# Patient Record
Sex: MALE | Race: WHITE | Employment: FULL TIME | URBAN - METROPOLITAN AREA
[De-identification: names, ages, dates, MRNs, and addresses within clinical notes are randomized per-mention and may not be internally consistent; named-entity substitution may affect disease eponyms.]

---

## 2018-06-26 ENCOUNTER — LAB ENCOUNTER (OUTPATIENT)
Dept: LAB | Age: 23
End: 2018-06-26
Attending: EMERGENCY MEDICINE
Payer: COMMERCIAL

## 2018-06-26 DIAGNOSIS — R10.9 ABDOMINAL PAIN: Primary | ICD-10-CM

## 2018-06-26 PROCEDURE — 36415 COLL VENOUS BLD VENIPUNCTURE: CPT

## 2018-06-26 PROCEDURE — 80061 LIPID PANEL: CPT

## 2018-06-26 PROCEDURE — 83690 ASSAY OF LIPASE: CPT

## 2018-06-26 PROCEDURE — 80053 COMPREHEN METABOLIC PANEL: CPT

## 2018-06-26 PROCEDURE — 85025 COMPLETE CBC W/AUTO DIFF WBC: CPT

## 2018-11-30 ENCOUNTER — APPOINTMENT (OUTPATIENT)
Dept: LAB | Age: 23
End: 2018-11-30
Attending: INTERNAL MEDICINE
Payer: COMMERCIAL

## 2018-11-30 DIAGNOSIS — R79.89 ABNORMAL LIVER FUNCTION TESTS: ICD-10-CM

## 2018-11-30 PROCEDURE — 80076 HEPATIC FUNCTION PANEL: CPT

## 2018-11-30 PROCEDURE — 36415 COLL VENOUS BLD VENIPUNCTURE: CPT

## 2018-12-14 ENCOUNTER — HOSPITAL ENCOUNTER (OUTPATIENT)
Dept: ULTRASOUND IMAGING | Age: 23
Discharge: HOME | End: 2018-12-14
Attending: INTERNAL MEDICINE
Payer: COMMERCIAL

## 2018-12-14 ENCOUNTER — LAB ENCOUNTER (OUTPATIENT)
Dept: LAB | Age: 23
End: 2018-12-14
Attending: INTERNAL MEDICINE
Payer: COMMERCIAL

## 2018-12-14 DIAGNOSIS — R79.89 ABNORMAL LIVER FUNCTION TESTS: ICD-10-CM

## 2018-12-14 PROCEDURE — 83540 ASSAY OF IRON: CPT

## 2018-12-14 PROCEDURE — 84439 ASSAY OF FREE THYROXINE: CPT

## 2018-12-14 PROCEDURE — 82728 ASSAY OF FERRITIN: CPT | Performed by: INTERNAL MEDICINE

## 2018-12-14 PROCEDURE — 83516 IMMUNOASSAY NONANTIBODY: CPT

## 2018-12-14 PROCEDURE — 82784 ASSAY IGA/IGD/IGG/IGM EACH: CPT

## 2018-12-14 PROCEDURE — 84443 ASSAY THYROID STIM HORMONE: CPT

## 2018-12-14 PROCEDURE — 86038 ANTINUCLEAR ANTIBODIES: CPT

## 2018-12-14 PROCEDURE — 86704 HEP B CORE ANTIBODY TOTAL: CPT

## 2018-12-14 PROCEDURE — 36415 COLL VENOUS BLD VENIPUNCTURE: CPT

## 2018-12-14 PROCEDURE — 83550 IRON BINDING TEST: CPT

## 2018-12-14 PROCEDURE — 86706 HEP B SURFACE ANTIBODY: CPT | Performed by: INTERNAL MEDICINE

## 2018-12-14 PROCEDURE — 83516 IMMUNOASSAY NONANTIBODY: CPT | Performed by: INTERNAL MEDICINE

## 2018-12-14 PROCEDURE — 86256 FLUORESCENT ANTIBODY TITER: CPT

## 2018-12-14 PROCEDURE — 76700 US EXAM ABDOM COMPLETE: CPT | Performed by: INTERNAL MEDICINE

## 2018-12-14 PROCEDURE — 86803 HEPATITIS C AB TEST: CPT | Performed by: INTERNAL MEDICINE

## 2018-12-14 PROCEDURE — 82103 ALPHA-1-ANTITRYPSIN TOTAL: CPT | Performed by: INTERNAL MEDICINE

## 2018-12-14 PROCEDURE — 86706 HEP B SURFACE ANTIBODY: CPT

## 2018-12-14 PROCEDURE — 87340 HEPATITIS B SURFACE AG IA: CPT

## 2020-05-15 ENCOUNTER — HOSPITAL ENCOUNTER (OUTPATIENT)
Dept: GENERAL RADIOLOGY | Age: 25
Discharge: STILL ADMITTED OR ADMINISTRATIVE | End: 2020-05-15
Attending: EMERGENCY MEDICINE

## 2020-05-15 ENCOUNTER — APPOINTMENT (OUTPATIENT)
Dept: LAB | Age: 25
End: 2020-05-15
Attending: EMERGENCY MEDICINE

## 2020-05-15 ENCOUNTER — HOSPITAL ENCOUNTER (OUTPATIENT)
Age: 25
Discharge: STILL ADMITTED OR ADMINISTRATIVE | End: 2020-05-15

## 2020-05-15 DIAGNOSIS — J18.9 PNEUMONIA: ICD-10-CM

## 2020-05-15 PROCEDURE — 71046 X-RAY EXAM CHEST 2 VIEWS: CPT | Performed by: EMERGENCY MEDICINE

## 2020-10-03 ENCOUNTER — APPOINTMENT (OUTPATIENT)
Dept: GENERAL RADIOLOGY | Age: 25
End: 2020-10-03
Attending: PHYSICIAN ASSISTANT
Payer: COMMERCIAL

## 2020-10-03 ENCOUNTER — HOSPITAL ENCOUNTER (EMERGENCY)
Age: 25
Discharge: HOME | End: 2020-10-03
Attending: EMERGENCY MEDICINE
Payer: COMMERCIAL

## 2020-10-03 VITALS
HEART RATE: 82 BPM | DIASTOLIC BLOOD PRESSURE: 82 MMHG | RESPIRATION RATE: 18 BRPM | HEIGHT: 75 IN | SYSTOLIC BLOOD PRESSURE: 123 MMHG | TEMPERATURE: 98 F | OXYGEN SATURATION: 97 % | BODY MASS INDEX: 29.84 KG/M2 | WEIGHT: 240 LBS

## 2020-10-03 DIAGNOSIS — R07.9 CHEST PAIN OF UNCERTAIN ETIOLOGY: ICD-10-CM

## 2020-10-03 DIAGNOSIS — J06.9 VIRAL URI WITH COUGH: Primary | ICD-10-CM

## 2020-10-03 DIAGNOSIS — F17.200 NEEDS SMOKING CESSATION EDUCATION: ICD-10-CM

## 2020-10-03 PROCEDURE — 99284 EMERGENCY DEPT VISIT MOD MDM: CPT

## 2020-10-03 PROCEDURE — 99285 EMERGENCY DEPT VISIT HI MDM: CPT

## 2020-10-03 PROCEDURE — 93005 ELECTROCARDIOGRAM TRACING: CPT

## 2020-10-03 PROCEDURE — 87430 STREP A AG IA: CPT | Performed by: PHYSICIAN ASSISTANT

## 2020-10-03 PROCEDURE — 87081 CULTURE SCREEN ONLY: CPT | Performed by: PHYSICIAN ASSISTANT

## 2020-10-03 PROCEDURE — 85025 COMPLETE CBC W/AUTO DIFF WBC: CPT | Performed by: PHYSICIAN ASSISTANT

## 2020-10-03 PROCEDURE — 85730 THROMBOPLASTIN TIME PARTIAL: CPT | Performed by: PHYSICIAN ASSISTANT

## 2020-10-03 PROCEDURE — 36415 COLL VENOUS BLD VENIPUNCTURE: CPT

## 2020-10-03 PROCEDURE — 85610 PROTHROMBIN TIME: CPT | Performed by: PHYSICIAN ASSISTANT

## 2020-10-03 PROCEDURE — 93010 ELECTROCARDIOGRAM REPORT: CPT

## 2020-10-03 PROCEDURE — 85379 FIBRIN DEGRADATION QUANT: CPT | Performed by: PHYSICIAN ASSISTANT

## 2020-10-03 PROCEDURE — 71045 X-RAY EXAM CHEST 1 VIEW: CPT | Performed by: PHYSICIAN ASSISTANT

## 2020-10-03 PROCEDURE — 84484 ASSAY OF TROPONIN QUANT: CPT | Performed by: PHYSICIAN ASSISTANT

## 2020-10-03 PROCEDURE — 80053 COMPREHEN METABOLIC PANEL: CPT | Performed by: PHYSICIAN ASSISTANT

## 2020-10-03 NOTE — ED PROVIDER NOTES
The patient's case was discussed with me by physician's assistant Kehinde Arriaga. I interviewed and examined the patient. Agree with the work-up thus far. Await d-dimer results to determine whether a CTA of the chest needs to be performed.   Otherwise agre

## 2020-10-03 NOTE — ED PROVIDER NOTES
Patient Seen in: [facility] Emergency Department In [place]      History   Patient presents with:  Sore Throat    Stated Complaint: Congestion, sore throat, headache x 2 weeks.      38-year-old  male without significant past medical history pressumeet normal weight. He is not ill-appearing, toxic-appearing or diaphoretic. HENT:      Head: Normocephalic and atraumatic. Nose: No congestion or rhinorrhea. Right Sinus: No maxillary sinus tenderness or frontal sinus tenderness.       Left Sinus: N ---------                               -----------         ------                     CBC W/ DIFFERENTIAL[[id number]]                              Final result                 Please view results for these tests on the individual orders.    SCAN SLIDE   G benign lesions such as granuloma. There is no infiltrate, consolidation or pleural effusion. There is no pulmonary edema. No acute osseous abnormality identified.             CONCLUSION:  1. Stable nodular opacities in both upper lung zones likely reflec

## 2020-11-23 ENCOUNTER — APPOINTMENT (OUTPATIENT)
Dept: CT IMAGING | Age: 25
End: 2020-11-23
Attending: EMERGENCY MEDICINE
Payer: COMMERCIAL

## 2020-11-23 ENCOUNTER — HOSPITAL ENCOUNTER (EMERGENCY)
Age: 25
Discharge: HOME | End: 2020-11-23
Attending: EMERGENCY MEDICINE
Payer: COMMERCIAL

## 2020-11-23 VITALS
HEART RATE: 74 BPM | OXYGEN SATURATION: 97 % | RESPIRATION RATE: 18 BRPM | HEIGHT: 75 IN | WEIGHT: 241 LBS | SYSTOLIC BLOOD PRESSURE: 125 MMHG | DIASTOLIC BLOOD PRESSURE: 64 MMHG | TEMPERATURE: 98 F | BODY MASS INDEX: 29.97 KG/M2

## 2020-11-23 DIAGNOSIS — R06.02 SHORTNESS OF BREATH: ICD-10-CM

## 2020-11-23 DIAGNOSIS — K21.00 GASTROESOPHAGEAL REFLUX DISEASE WITH ESOPHAGITIS WITHOUT HEMORRHAGE: Primary | ICD-10-CM

## 2020-11-23 PROCEDURE — 80053 COMPREHEN METABOLIC PANEL: CPT | Performed by: EMERGENCY MEDICINE

## 2020-11-23 PROCEDURE — 71260 CT THORAX DX C+: CPT | Performed by: EMERGENCY MEDICINE

## 2020-11-23 PROCEDURE — 99284 EMERGENCY DEPT VISIT MOD MDM: CPT

## 2020-11-23 PROCEDURE — 85025 COMPLETE CBC W/AUTO DIFF WBC: CPT | Performed by: EMERGENCY MEDICINE

## 2020-11-23 PROCEDURE — 85379 FIBRIN DEGRADATION QUANT: CPT | Performed by: EMERGENCY MEDICINE

## 2020-11-23 PROCEDURE — 36415 COLL VENOUS BLD VENIPUNCTURE: CPT

## 2020-11-23 RX ORDER — ALBUTEROL SULFATE 90 UG/1
AEROSOL, METERED RESPIRATORY (INHALATION) EVERY 6 HOURS PRN
COMMUNITY

## 2020-11-23 RX ORDER — DIAZEPAM 5 MG/1
5 TABLET ORAL EVERY 12 HOURS PRN
COMMUNITY

## 2020-11-23 NOTE — ED PROVIDER NOTES
Patient Seen in: [facility] In [place]      History   Patient presents with:  Cough/URI  Difficulty Breathing    Stated Complaint: short of breath for two months, worsens every day, cough, no fever,     HPI    57-year-old male present wearing a droplet mask on my arrival to the room.  Personal protective equipment including droplet mask, eye protection, and gloves were worn throughout the duration of the exam.  Handwashing was performed prior to and after the exam.  Stethoscope and any e INDICATIONS:  short of breath for two months, worsens every day, cough, no fever,  TECHNIQUE:  CT images were obtained with non-ionic intravenous contrast material. Dose reduction techniques were used.  Dose information is transmitted to the ACR (American C instructed to take medications as prescribed. Patient is aware that they are to return to ED if any worsening problems. Patient was also instructed to followup with the appropriate physician. Patient verbalizes and agrees with plan.   Patient discharged

## 2020-11-23 NOTE — ED INITIAL ASSESSMENT (HPI)
Pt c/o SOB x 2 months. +cough x about 2 months. Sts he has to use inhaler every morning, but  Not sure if it helps. Was tested for covid when cough first started and was negative. No fever. Sts, I feel like my throat is tight all the time.

## 2020-12-09 ENCOUNTER — ORDER TRANSCRIPTION (OUTPATIENT)
Dept: ADMINISTRATIVE | Facility: HOSPITAL | Age: 25
End: 2020-12-09

## 2020-12-09 DIAGNOSIS — R06.00 DYSPNEA: Primary | ICD-10-CM

## 2020-12-09 DIAGNOSIS — Z01.818 PREOP EXAMINATION: Primary | ICD-10-CM

## 2020-12-09 DIAGNOSIS — Z11.59 ENCOUNTER FOR SCREENING FOR OTHER VIRAL DISEASES: ICD-10-CM

## 2020-12-30 ENCOUNTER — HOSPITAL ENCOUNTER (EMERGENCY)
Facility: HOSPITAL | Age: 25
Discharge: HOME | End: 2020-12-30
Attending: EMERGENCY MEDICINE
Payer: MEDICAID

## 2020-12-30 ENCOUNTER — APPOINTMENT (OUTPATIENT)
Dept: GENERAL RADIOLOGY | Facility: HOSPITAL | Age: 25
End: 2020-12-30
Attending: EMERGENCY MEDICINE
Payer: MEDICAID

## 2020-12-30 VITALS
TEMPERATURE: 100 F | WEIGHT: 250 LBS | OXYGEN SATURATION: 96 % | RESPIRATION RATE: 13 BRPM | SYSTOLIC BLOOD PRESSURE: 124 MMHG | HEART RATE: 97 BPM | DIASTOLIC BLOOD PRESSURE: 86 MMHG | HEIGHT: 70 IN | BODY MASS INDEX: 35.79 KG/M2

## 2020-12-30 DIAGNOSIS — U07.1 COVID-19 VIRUS INFECTION: Primary | ICD-10-CM

## 2020-12-30 LAB
ALBUMIN SERPL-MCNC: 4.3 G/DL (ref 3.4–5)
ALBUMIN/GLOB SERPL: 1.2 {RATIO} (ref 1–2)
ALP LIVER SERPL-CCNC: 58 U/L
ALT SERPL-CCNC: 63 U/L
ANION GAP SERPL CALC-SCNC: 4 MMOL/L (ref 0–18)
AST SERPL-CCNC: 49 U/L (ref 15–37)
BASOPHILS # BLD AUTO: 0.07 X10(3) UL (ref 0–0.2)
BASOPHILS NFR BLD AUTO: 1.3 %
BILIRUB SERPL-MCNC: 0.6 MG/DL (ref 0.1–2)
BUN BLD-MCNC: 13 MG/DL (ref 7–18)
BUN/CREAT SERPL: 10.5 (ref 10–20)
CALCIUM BLD-MCNC: 9 MG/DL (ref 8.5–10.1)
CHLORIDE SERPL-SCNC: 108 MMOL/L (ref 98–112)
CK SERPL-CCNC: 169 U/L
CO2 SERPL-SCNC: 22 MMOL/L (ref 21–32)
CREAT BLD-MCNC: 1.24 MG/DL
CRP SERPL-MCNC: 1.57 MG/DL (ref ?–0.3)
D-DIMER: 0.28 UG/ML FEU (ref ?–0.5)
DEPRECATED HBV CORE AB SER IA-ACNC: 127.9 NG/ML
DEPRECATED RDW RBC AUTO: 34.8 FL (ref 35.1–46.3)
EOSINOPHIL # BLD AUTO: 0.08 X10(3) UL (ref 0–0.7)
EOSINOPHIL NFR BLD AUTO: 1.4 %
ERYTHROCYTE [DISTWIDTH] IN BLOOD BY AUTOMATED COUNT: 12.2 % (ref 11–15)
GLOBULIN PLAS-MCNC: 3.7 G/DL (ref 2.8–4.4)
GLUCOSE BLD-MCNC: 96 MG/DL (ref 70–99)
HCT VFR BLD AUTO: 46.8 %
HGB BLD-MCNC: 16.5 G/DL
IMM GRANULOCYTES # BLD AUTO: 0.02 X10(3) UL (ref 0–1)
IMM GRANULOCYTES NFR BLD: 0.4 %
LDH SERPL L TO P-CCNC: 283 U/L
LYMPHOCYTES # BLD AUTO: 1.02 X10(3) UL (ref 1–4)
LYMPHOCYTES NFR BLD AUTO: 18.3 %
M PROTEIN MFR SERPL ELPH: 8 G/DL (ref 6.4–8.2)
MCH RBC QN AUTO: 28.2 PG (ref 26–34)
MCHC RBC AUTO-ENTMCNC: 35.3 G/DL (ref 31–37)
MCV RBC AUTO: 79.9 FL
MONOCYTES # BLD AUTO: 1.07 X10(3) UL (ref 0.1–1)
MONOCYTES NFR BLD AUTO: 19.2 %
NEUTROPHILS # BLD AUTO: 3.31 X10 (3) UL (ref 1.5–7.7)
NEUTROPHILS # BLD AUTO: 3.31 X10(3) UL (ref 1.5–7.7)
NEUTROPHILS NFR BLD AUTO: 59.4 %
NT-PROBNP SERPL-MCNC: 30 PG/ML (ref ?–125)
OSMOLALITY SERPL CALC.SUM OF ELEC: 278 MOSM/KG (ref 275–295)
PLATELET # BLD AUTO: 212 10(3)UL (ref 150–450)
POTASSIUM SERPL-SCNC: 4.3 MMOL/L (ref 3.5–5.1)
PROCALCITONIN SERPL-MCNC: <0.05 NG/ML (ref ?–0.16)
RBC # BLD AUTO: 5.86 X10(6)UL
SARS-COV-2 RNA RESP QL NAA+PROBE: DETECTED
SODIUM SERPL-SCNC: 134 MMOL/L (ref 136–145)
TROPONIN I SERPL-MCNC: <0.045 NG/ML (ref ?–0.04)
WBC # BLD AUTO: 5.6 X10(3) UL (ref 4–11)

## 2020-12-30 PROCEDURE — 83615 LACTATE (LD) (LDH) ENZYME: CPT | Performed by: EMERGENCY MEDICINE

## 2020-12-30 PROCEDURE — 99285 EMERGENCY DEPT VISIT HI MDM: CPT

## 2020-12-30 PROCEDURE — 85379 FIBRIN DEGRADATION QUANT: CPT | Performed by: EMERGENCY MEDICINE

## 2020-12-30 PROCEDURE — 82728 ASSAY OF FERRITIN: CPT | Performed by: EMERGENCY MEDICINE

## 2020-12-30 PROCEDURE — 86140 C-REACTIVE PROTEIN: CPT | Performed by: EMERGENCY MEDICINE

## 2020-12-30 PROCEDURE — 82550 ASSAY OF CK (CPK): CPT | Performed by: EMERGENCY MEDICINE

## 2020-12-30 PROCEDURE — 80053 COMPREHEN METABOLIC PANEL: CPT | Performed by: EMERGENCY MEDICINE

## 2020-12-30 PROCEDURE — 83880 ASSAY OF NATRIURETIC PEPTIDE: CPT | Performed by: EMERGENCY MEDICINE

## 2020-12-30 PROCEDURE — 85025 COMPLETE CBC W/AUTO DIFF WBC: CPT | Performed by: EMERGENCY MEDICINE

## 2020-12-30 PROCEDURE — 36415 COLL VENOUS BLD VENIPUNCTURE: CPT

## 2020-12-30 PROCEDURE — 99284 EMERGENCY DEPT VISIT MOD MDM: CPT

## 2020-12-30 PROCEDURE — 71045 X-RAY EXAM CHEST 1 VIEW: CPT | Performed by: EMERGENCY MEDICINE

## 2020-12-30 PROCEDURE — 84484 ASSAY OF TROPONIN QUANT: CPT | Performed by: EMERGENCY MEDICINE

## 2020-12-30 PROCEDURE — 84145 PROCALCITONIN (PCT): CPT | Performed by: EMERGENCY MEDICINE

## 2020-12-30 NOTE — ED INITIAL ASSESSMENT (HPI)
A/o x3, pt with flu like symptoms that began today around 3am, fever of 101, also body aches, cough and sore throat, took Tylenol this morning, also lost smell. Pt also feels chest pressure and OSIEL which is worse on exertion, using inhaler.

## 2020-12-30 NOTE — ED PROVIDER NOTES
Patient Seen in: [facility] Emergency Department      History   Patient presents with:  Cough/URI  Fever    Stated Complaint: Cough, fever, exposed to COVID; symptoms started at 0300 today    HPI    43-year-old male presents emergency room with milagros membranes are moist, oropharynx is clear, uvula midline. HEART: Regular rate and rhythm, no murmurs. LUNGS: Clear to auscultation bilaterally. No Rales, no rhonchi, no wheezing, no stridor.   ABDOMEN: Soft, nondistended,non tender  EXTREMITIES: No sandy of critical care time (exclusive of billable procedures) was administered to manage the patient's critical lab values due to his Covid infection requiring emergency use authorization of monoclonal antibody bamlanivimab due to his BMI greater than 35.   This pm    Follow-up:  Raymundo Barrera MD  [address] [facility] [phone]    In 2 days            Medications Prescribed:  Current Discharge Medication List

## 2021-02-02 ENCOUNTER — RT VISIT (OUTPATIENT)
Dept: RESPIRATORY THERAPY | Facility: HOSPITAL | Age: 26
End: 2021-02-02
Attending: INTERNAL MEDICINE
Payer: MEDICAID

## 2021-02-02 DIAGNOSIS — R06.00 DYSPNEA: ICD-10-CM

## 2021-02-02 PROCEDURE — 94729 DIFFUSING CAPACITY: CPT

## 2021-02-02 PROCEDURE — 94060 EVALUATION OF WHEEZING: CPT

## 2021-02-02 PROCEDURE — 94726 PLETHYSMOGRAPHY LUNG VOLUMES: CPT

## 2021-02-03 NOTE — PROCEDURES
Findings:  Postbronchodilator FEV1 is 3.97L, 75% predicted. Postbronchodilator FVC is 5.00L, 77% predicted. FEV1/ FVC ratio is 0.79. There is a significant bronchodilator response after   administration of albuterol.    The flow-volume loop demonstrates

## 2021-03-10 ENCOUNTER — TELEPHONE (OUTPATIENT)
Dept: INTERNAL MEDICINE CLINIC | Facility: CLINIC | Age: 26
End: 2021-03-10

## 2021-03-10 ENCOUNTER — OFFICE VISIT (OUTPATIENT)
Dept: INTERNAL MEDICINE CLINIC | Facility: CLINIC | Age: 26
End: 2021-03-10
Payer: MEDICAID

## 2021-03-10 VITALS
SYSTOLIC BLOOD PRESSURE: 112 MMHG | HEART RATE: 64 BPM | HEIGHT: 75 IN | TEMPERATURE: 98 F | DIASTOLIC BLOOD PRESSURE: 78 MMHG | RESPIRATION RATE: 18 BRPM | BODY MASS INDEX: 31.41 KG/M2 | WEIGHT: 252.63 LBS

## 2021-03-10 DIAGNOSIS — Z00.00 PE (PHYSICAL EXAM), ANNUAL: Primary | ICD-10-CM

## 2021-03-10 DIAGNOSIS — Z13.220 SCREENING CHOLESTEROL LEVEL: ICD-10-CM

## 2021-03-10 DIAGNOSIS — Z00.00 LABORATORY EXAMINATION ORDERED AS PART OF A ROUTINE GENERAL MEDICAL EXAMINATION: ICD-10-CM

## 2021-03-10 DIAGNOSIS — M79.676 PAIN AROUND TOENAIL: ICD-10-CM

## 2021-03-10 DIAGNOSIS — J45.20 MILD INTERMITTENT ASTHMA, UNSPECIFIED WHETHER COMPLICATED: ICD-10-CM

## 2021-03-10 DIAGNOSIS — Z13.29 SCREENING FOR THYROID DISORDER: ICD-10-CM

## 2021-03-10 PROCEDURE — 99385 PREV VISIT NEW AGE 18-39: CPT | Performed by: INTERNAL MEDICINE

## 2021-03-10 PROCEDURE — 3078F DIAST BP <80 MM HG: CPT | Performed by: INTERNAL MEDICINE

## 2021-03-10 PROCEDURE — 3008F BODY MASS INDEX DOCD: CPT | Performed by: INTERNAL MEDICINE

## 2021-03-10 PROCEDURE — 3074F SYST BP LT 130 MM HG: CPT | Performed by: INTERNAL MEDICINE

## 2021-03-10 RX ORDER — MONTELUKAST SODIUM 10 MG/1
10 TABLET ORAL DAILY
Qty: 90 TABLET | Refills: 3 | Status: SHIPPED | OUTPATIENT
Start: 2021-03-10 | End: 2022-03-05

## 2021-03-10 RX ORDER — BUDESONIDE AND FORMOTEROL FUMARATE DIHYDRATE 160; 4.5 UG/1; UG/1
2 AEROSOL RESPIRATORY (INHALATION) 2 TIMES DAILY
Qty: 1 INHALER | Refills: 0 | Status: SHIPPED | OUTPATIENT
Start: 2021-03-10 | End: 2021-04-09

## 2021-03-10 NOTE — PROGRESS NOTES
Patient presents with:  Establish Care: MR rm 8 NP estalish care       HPI:  Here for cpe. Pt has been having wheezing for 3 mos with a cough at times, with some delaney and sob, worse in the morning. Struggling with this for almost a year.  Normal cxr, pft w Used    Vaping Use      Vaping Use: Never used    Alcohol use: Not Currently      Alcohol/week: 15.0 standard drinks      Types: 15 Standard drinks or equivalent per week    Drug use: No      Current Outpatient Medications   Medication Sig Dispense Refill organomegaly or hernias. Musculoskeletal: Normal range of motion. No edema and no tenderness. No effusions. Lymphadenopathy: No cervical adenopathy. Neurological: Normal reflexes. No cranial nerve deficit or sensory deficit. Normal muscle tone.  Coordi

## 2021-03-10 NOTE — TELEPHONE ENCOUNTER
Pt calling, states he went to pharmacy to  medication and the pharmacist advised him a prior Samuel Grand is needed. Medication Requires Prior Authorization    Name of Medication (include dose, strength and quantity):       Montelukast Sodium (SINGULAIR)

## 2021-03-13 NOTE — TELEPHONE ENCOUNTER
Per Pharmacist Yessy Cisneros, Both medications went through and were picked up by patient. No PA required.

## 2021-04-07 ENCOUNTER — OFFICE VISIT (OUTPATIENT)
Dept: INTERNAL MEDICINE CLINIC | Facility: CLINIC | Age: 26
End: 2021-04-07
Payer: MEDICAID

## 2021-04-07 VITALS
HEIGHT: 75 IN | RESPIRATION RATE: 18 BRPM | BODY MASS INDEX: 31.11 KG/M2 | WEIGHT: 250.19 LBS | TEMPERATURE: 97 F | HEART RATE: 76 BPM | SYSTOLIC BLOOD PRESSURE: 116 MMHG | DIASTOLIC BLOOD PRESSURE: 80 MMHG

## 2021-04-07 DIAGNOSIS — J45.30 MILD PERSISTENT ASTHMA WITHOUT COMPLICATION: Primary | ICD-10-CM

## 2021-04-07 PROCEDURE — 3079F DIAST BP 80-89 MM HG: CPT | Performed by: INTERNAL MEDICINE

## 2021-04-07 PROCEDURE — 3008F BODY MASS INDEX DOCD: CPT | Performed by: INTERNAL MEDICINE

## 2021-04-07 PROCEDURE — 99213 OFFICE O/P EST LOW 20 MIN: CPT | Performed by: INTERNAL MEDICINE

## 2021-04-07 PROCEDURE — 3074F SYST BP LT 130 MM HG: CPT | Performed by: INTERNAL MEDICINE

## 2021-04-09 ENCOUNTER — TELEPHONE (OUTPATIENT)
Dept: INTERNAL MEDICINE CLINIC | Facility: CLINIC | Age: 26
End: 2021-04-09

## 2021-04-09 RX ORDER — BUDESONIDE AND FORMOTEROL FUMARATE DIHYDRATE 160; 4.5 UG/1; UG/1
2 AEROSOL RESPIRATORY (INHALATION) 2 TIMES DAILY
Qty: 1 INHALER | Refills: 0 | Status: SHIPPED | OUTPATIENT
Start: 2021-04-09 | End: 2021-05-21

## 2021-04-09 RX ORDER — BUDESONIDE AND FORMOTEROL FUMARATE DIHYDRATE 160; 4.5 UG/1; UG/1
2 AEROSOL RESPIRATORY (INHALATION) 2 TIMES DAILY
Qty: 1 INHALER | Refills: 0 | Status: SHIPPED | OUTPATIENT
Start: 2021-04-09 | End: 2021-04-09

## 2021-04-09 NOTE — TELEPHONE ENCOUNTER
LOV:3/10/21, CPE, AS  Last CPE:3/10/21, CPE, AS  Last refill:Budesonide-Formoterol Fumarate (SYMBICORT) 160-4.5 MCG/ACT Inhalation Aerosol,3/10/21  Quantity:1 inhaler, 0 refills  Last labs that are related: cbc 12/30/20  Future appointment:  Future Appoint

## 2021-04-09 NOTE — TELEPHONE ENCOUNTER
Pt needs refill sent to     [facility] [address], [address] [place] [address] AT [address], [phone], [phone]

## 2021-04-11 PROBLEM — J45.30 MILD PERSISTENT ASTHMA WITHOUT COMPLICATION: Status: ACTIVE | Noted: 2021-04-11

## 2021-04-11 PROBLEM — J45.30 MILD PERSISTENT ASTHMA WITHOUT COMPLICATION (HCC): Status: ACTIVE | Noted: 2021-04-11

## 2021-04-12 NOTE — PROGRESS NOTES
Patient presents with:  Medication Follow-Up: MR rm 8 allergy med f/up       HPI:  Here for f/u of asthma. Started symbicort and singulair. Pt is much improved, no longer wheezing. Only using his albuterol 1-2 times pr week ow, compared to 6 times per day. carotid pulses. No masses. Cardiovascular: Normal rate, regular rhythm and intact distal pulses. No murmur, rubs or gallops. Pulmonary/Chest: Effort normal and breath sounds normal. No respiratory distress. Abdominal: Soft.  Bowel sounds are normal. No

## 2021-05-21 RX ORDER — BUDESONIDE AND FORMOTEROL FUMARATE DIHYDRATE 160; 4.5 UG/1; UG/1
2 AEROSOL RESPIRATORY (INHALATION) 2 TIMES DAILY
Qty: 3 INHALER | Refills: 0 | Status: SHIPPED | OUTPATIENT
Start: 2021-05-21 | End: 2021-09-24

## 2021-05-21 NOTE — TELEPHONE ENCOUNTER
Last OV 4.7. 21 w/ AS (med f/up)   Last PE 3.10.21   Last REFILL 4.9.21 Symbicort 160-4.5mcg #1inhaler 0R  Last LABS No recent labs within last 12 months     Future Appointments   Date Time Provider Flower Moraes   10/8/2021  2:30 PM Talbert Simmonds, MD

## 2021-09-27 RX ORDER — BUDESONIDE AND FORMOTEROL FUMARATE DIHYDRATE 160; 4.5 UG/1; UG/1
2 AEROSOL RESPIRATORY (INHALATION) 2 TIMES DAILY
Qty: 1 EACH | Refills: 0 | Status: SHIPPED | OUTPATIENT
Start: 2021-09-27 | End: 2021-11-02

## 2021-09-27 NOTE — TELEPHONE ENCOUNTER
Been Following AS  Last OV 4/7/21  Last CPE 3/10/21  Last Labs CBC, CMP 12/30/20    Last Rx fill Symbivort 150/4.5mcg/act #3 0R 5/21/21    Future Appointments   Date Time Provider Flower Moraes   1/19/2022 10:30 AM Omar Dickens MD [facility]

## 2021-09-29 ENCOUNTER — TELEPHONE (OUTPATIENT)
Dept: INTERNAL MEDICINE CLINIC | Facility: CLINIC | Age: 26
End: 2021-09-29

## 2021-09-29 NOTE — TELEPHONE ENCOUNTER
Symbicort not covered by insurance. QL. Submitted PA via CoverMyMeds.  Awaiting approval/denial.     Key: [id number]

## 2021-10-07 ENCOUNTER — TELEPHONE (OUTPATIENT)
Dept: INTERNAL MEDICINE CLINIC | Facility: CLINIC | Age: 26
End: 2021-10-07

## 2021-10-07 NOTE — TELEPHONE ENCOUNTER
Received denial letter from [facility] [address] [place] [address] for prior authorization. Placed in bin for scanning.

## 2021-10-14 ENCOUNTER — LAB ENCOUNTER (OUTPATIENT)
Dept: LAB | Age: 26
End: 2021-10-14
Attending: INTERNAL MEDICINE
Payer: MEDICAID

## 2021-10-14 DIAGNOSIS — Z13.29 SCREENING FOR THYROID DISORDER: ICD-10-CM

## 2021-10-14 DIAGNOSIS — Z13.220 SCREENING CHOLESTEROL LEVEL: ICD-10-CM

## 2021-10-14 DIAGNOSIS — Z00.00 LABORATORY EXAMINATION ORDERED AS PART OF A ROUTINE GENERAL MEDICAL EXAMINATION: ICD-10-CM

## 2021-10-14 PROCEDURE — 84443 ASSAY THYROID STIM HORMONE: CPT

## 2021-10-14 PROCEDURE — 80053 COMPREHEN METABOLIC PANEL: CPT

## 2021-10-14 PROCEDURE — 36415 COLL VENOUS BLD VENIPUNCTURE: CPT

## 2021-10-14 PROCEDURE — 84439 ASSAY OF FREE THYROXINE: CPT

## 2021-10-14 PROCEDURE — 80061 LIPID PANEL: CPT

## 2021-11-02 RX ORDER — BUDESONIDE AND FORMOTEROL FUMARATE DIHYDRATE 160; 4.5 UG/1; UG/1
2 AEROSOL RESPIRATORY (INHALATION) 2 TIMES DAILY
Qty: 10.2 G | Refills: 3 | Status: SHIPPED | OUTPATIENT
Start: 2021-11-02 | End: 2021-12-28

## 2021-11-02 NOTE — TELEPHONE ENCOUNTER
Last OV: 4/7/21 med f/u-A. S. Last PE: 3/10/21- A. S.     Future Appointments   Date Time Provider Flower Aleisha   1/19/2022 10:30 AM Dakota Luther MD [facility]        Latest labs: 4/7/21 ACT- score 18    Latest RX: symbicort- 1 each 0 refi

## 2021-12-29 RX ORDER — BUDESONIDE AND FORMOTEROL FUMARATE DIHYDRATE 160; 4.5 UG/1; UG/1
2 AEROSOL RESPIRATORY (INHALATION) 2 TIMES DAILY
Qty: 10.2 G | Refills: 0 | Status: SHIPPED | OUTPATIENT
Start: 2021-12-29 | End: 2022-01-29

## 2022-01-28 NOTE — TELEPHONE ENCOUNTER
Last OV: 4/7/21 med f/u  Last PE: 3/10/21 est care    Future Appointments   Date Time Provider Flower Aleisha   5/11/2022  6:00 PM Kaiden Valdez MD [facility]        Latest labs: 10/14/21 TSH, Lipid and CMP.  No Hx of ACT    Latest RX: Symbi

## 2022-01-29 RX ORDER — BUDESONIDE AND FORMOTEROL FUMARATE DIHYDRATE 160; 4.5 UG/1; UG/1
AEROSOL RESPIRATORY (INHALATION)
Qty: 10.2 G | Refills: 0 | Status: SHIPPED | OUTPATIENT
Start: 2022-01-29

## 2022-03-03 RX ORDER — BUDESONIDE AND FORMOTEROL FUMARATE DIHYDRATE 160; 4.5 UG/1; UG/1
2 AEROSOL RESPIRATORY (INHALATION) 2 TIMES DAILY
Qty: 10.2 G | Refills: 0 | Status: SHIPPED | OUTPATIENT
Start: 2022-03-03

## 2022-03-03 NOTE — TELEPHONE ENCOUNTER
Last VISIT 04/07/21    Last CPE 03/10/21    Last REFILL 01/29/22 qty 10.2g w/0 refills    Last LABS 10/14/21 CMP, Lipid, TSH done     Future Appointments   Date Time Provider Flower Moraes   5/11/2022  6:00 PM Thom Murrell MD [facility]         Per PROTOCOL? Failed       Please Approve or Deny.

## 2022-04-09 ENCOUNTER — TELEPHONE (OUTPATIENT)
Dept: INTERNAL MEDICINE CLINIC | Facility: CLINIC | Age: 27
End: 2022-04-09

## 2022-04-09 NOTE — TELEPHONE ENCOUNTER
ERICA received from pt requesting records to be sent to [facility]. Form sent to scan stat for further processing and a copy of form sent to scanning.

## 2022-06-30 RX ORDER — BUDESONIDE AND FORMOTEROL FUMARATE DIHYDRATE 160; 4.5 UG/1; UG/1
2 AEROSOL RESPIRATORY (INHALATION) 2 TIMES DAILY
Qty: 10.2 G | Refills: 0 | Status: SHIPPED | OUTPATIENT
Start: 2022-06-30

## 2022-06-30 NOTE — TELEPHONE ENCOUNTER
LOV  4-7-21 no future scheduled.      LF  3-3-22    FD patient needs appointment no refill given yet

## 2022-06-30 NOTE — TELEPHONE ENCOUNTER
BP (!) 159/64   Pulse 64   Temp 97.4 °F (36.3 °C) (Oral)   Resp 16   Ht 5' 6\" (1.676 m)   Wt 143 lb 12.8 oz (65.2 kg)   SpO2 90%   BMI 23.21 kg/m²     Shift assessment complete; see flowsheets. PM medications administered; see MAR. Pt oriented to self and time. Re-oriented to place and situation. Respirations even and unlabored. Pt c/o 8 out of 10 back pain, will give PRN pain medication. Pt denies any further needs or pain at this time. Call light in reach, bed locked in lowest position. LOV   4-7-21    3-3-22      FD needs appt

## 2022-07-06 NOTE — TELEPHONE ENCOUNTER
Future Appointments   Date Time Provider Flower Aleisha   10/12/2022  4:00 PM Kaiden Valdez MD [facility]

## 2022-07-07 RX ORDER — BUDESONIDE AND FORMOTEROL FUMARATE DIHYDRATE 160; 4.5 UG/1; UG/1
AEROSOL RESPIRATORY (INHALATION)
Qty: 10.2 G | Refills: 0 | OUTPATIENT
Start: 2022-07-07

## 2022-08-04 RX ORDER — BUDESONIDE AND FORMOTEROL FUMARATE DIHYDRATE 160; 4.5 UG/1; UG/1
2 AEROSOL RESPIRATORY (INHALATION) 2 TIMES DAILY
Qty: 10.2 G | Refills: 1 | Status: SHIPPED | OUTPATIENT
Start: 2022-08-04

## 2022-08-05 RX ORDER — BUDESONIDE AND FORMOTEROL FUMARATE DIHYDRATE 160; 4.5 UG/1; UG/1
AEROSOL RESPIRATORY (INHALATION)
Qty: 10.2 G | Refills: 0 | OUTPATIENT
Start: 2022-08-05

## 2022-10-03 NOTE — TELEPHONE ENCOUNTER
Last OV: 4/7/21 med f/u  Last PE: 3/10/21    Future Appointments   Date Time Provider Flower Moraes   11/7/2022 11:20 AM Gracia Grigsby MD [facility]        Latest labs: 10/14/21 TSH, Lipid and CMP ACT- 4/7/21- score 18    Latest RX: Symbicort- 10.2g 1 refill on 8/4/22    Per protocol, failed due to below. Rx pending.    Asthma Action Score greater than or equal to 20    AAP/ACT given in last 12 months
Bernadette Little)

## 2022-10-04 RX ORDER — BUDESONIDE AND FORMOTEROL FUMARATE DIHYDRATE 160; 4.5 UG/1; UG/1
AEROSOL RESPIRATORY (INHALATION)
Qty: 10.2 G | Refills: 1 | Status: SHIPPED | OUTPATIENT
Start: 2022-10-04

## 2022-11-07 ENCOUNTER — TELEPHONE (OUTPATIENT)
Dept: INTERNAL MEDICINE CLINIC | Facility: CLINIC | Age: 27
End: 2022-11-07

## 2022-12-05 RX ORDER — BUDESONIDE AND FORMOTEROL FUMARATE DIHYDRATE 160; 4.5 UG/1; UG/1
2 AEROSOL RESPIRATORY (INHALATION) 2 TIMES DAILY
Qty: 10.2 G | Refills: 1 | Status: SHIPPED | OUTPATIENT
Start: 2022-12-05

## 2022-12-05 RX ORDER — BUDESONIDE AND FORMOTEROL FUMARATE DIHYDRATE 160; 4.5 UG/1; UG/1
AEROSOL RESPIRATORY (INHALATION)
Qty: 10.2 G | Refills: 1 | Status: SHIPPED | OUTPATIENT
Start: 2022-12-05

## 2023-02-09 RX ORDER — BUDESONIDE AND FORMOTEROL FUMARATE DIHYDRATE 160; 4.5 UG/1; UG/1
AEROSOL RESPIRATORY (INHALATION)
Qty: 10.2 G | Refills: 1 | Status: CANCELLED | OUTPATIENT
Start: 2023-02-09

## 2023-02-15 RX ORDER — BUDESONIDE AND FORMOTEROL FUMARATE DIHYDRATE 160; 4.5 UG/1; UG/1
AEROSOL RESPIRATORY (INHALATION)
Qty: 10.2 G | Refills: 1 | OUTPATIENT
Start: 2023-02-15

## 2023-02-15 RX ORDER — BUDESONIDE AND FORMOTEROL FUMARATE DIHYDRATE 160; 4.5 UG/1; UG/1
2 AEROSOL RESPIRATORY (INHALATION) 2 TIMES DAILY
Qty: 10.2 G | Refills: 0 | OUTPATIENT
Start: 2023-02-15

## 2023-03-17 ENCOUNTER — OFFICE VISIT (OUTPATIENT)
Dept: INTERNAL MEDICINE CLINIC | Facility: CLINIC | Age: 28
End: 2023-03-17
Payer: MEDICAID

## 2023-03-17 VITALS
DIASTOLIC BLOOD PRESSURE: 74 MMHG | WEIGHT: 283 LBS | SYSTOLIC BLOOD PRESSURE: 122 MMHG | HEIGHT: 75 IN | HEART RATE: 80 BPM | RESPIRATION RATE: 16 BRPM | BODY MASS INDEX: 35.19 KG/M2 | OXYGEN SATURATION: 98 %

## 2023-03-17 DIAGNOSIS — K21.00 GASTROESOPHAGEAL REFLUX DISEASE WITH ESOPHAGITIS WITHOUT HEMORRHAGE: ICD-10-CM

## 2023-03-17 DIAGNOSIS — E66.9 OBESITY (BMI 30-39.9): ICD-10-CM

## 2023-03-17 DIAGNOSIS — J45.30 MILD PERSISTENT ASTHMA WITHOUT COMPLICATION: Primary | ICD-10-CM

## 2023-03-17 DIAGNOSIS — K76.0 FATTY LIVER: ICD-10-CM

## 2023-03-17 DIAGNOSIS — F41.0 PANIC DISORDER: ICD-10-CM

## 2023-03-17 DIAGNOSIS — Z00.00 WELLNESS EXAMINATION: ICD-10-CM

## 2023-03-17 DIAGNOSIS — D75.1 ERYTHROCYTOSIS: ICD-10-CM

## 2023-03-17 DIAGNOSIS — R06.83 SNORING: ICD-10-CM

## 2023-03-17 PROCEDURE — 99395 PREV VISIT EST AGE 18-39: CPT | Performed by: FAMILY MEDICINE

## 2023-03-17 PROCEDURE — 90471 IMMUNIZATION ADMIN: CPT | Performed by: FAMILY MEDICINE

## 2023-03-17 PROCEDURE — 3078F DIAST BP <80 MM HG: CPT | Performed by: FAMILY MEDICINE

## 2023-03-17 PROCEDURE — 3074F SYST BP LT 130 MM HG: CPT | Performed by: FAMILY MEDICINE

## 2023-03-17 PROCEDURE — 90677 PCV20 VACCINE IM: CPT | Performed by: FAMILY MEDICINE

## 2023-03-17 PROCEDURE — 3008F BODY MASS INDEX DOCD: CPT | Performed by: FAMILY MEDICINE

## 2023-03-17 RX ORDER — BUDESONIDE AND FORMOTEROL FUMARATE DIHYDRATE 160; 4.5 UG/1; UG/1
2 AEROSOL RESPIRATORY (INHALATION) 2 TIMES DAILY
Qty: 10.2 G | Refills: 2 | Status: SHIPPED | OUTPATIENT
Start: 2023-03-17 | End: 2023-06-15

## 2023-03-17 RX ORDER — ALBUTEROL SULFATE 90 UG/1
2 AEROSOL, METERED RESPIRATORY (INHALATION) EVERY 4 HOURS PRN
Qty: 18 G | Refills: 0 | Status: SHIPPED | OUTPATIENT
Start: 2023-03-17

## 2023-07-07 DIAGNOSIS — J45.30 MILD PERSISTENT ASTHMA WITHOUT COMPLICATION: ICD-10-CM

## 2023-07-07 RX ORDER — BUDESONIDE AND FORMOTEROL FUMARATE DIHYDRATE 160; 4.5 UG/1; UG/1
AEROSOL RESPIRATORY (INHALATION)
Qty: 10.2 G | Refills: 2 | Status: SHIPPED | OUTPATIENT
Start: 2023-07-07

## 2023-07-07 NOTE — TELEPHONE ENCOUNTER
Requested Prescriptions     Pending Prescriptions Disp Refills    SYMBICORT 160-4.5 MCG/ACT Inhalation Aerosol [Pharmacy Med Name: SYMBICORT 160/4. 5MCG (120 ORAL INH)] 10.2 g 2     Sig: INHALE 2 PUFFS INTO THE LUNGS TWICE DAILY       LOV: 3/17/23    LAST PHYSICAL: 3/17/23  Return in about 1 year    LAST REFILL: 3/17/23    LAST LAB: 10/14/21

## 2023-09-19 DIAGNOSIS — J45.30 MILD PERSISTENT ASTHMA WITHOUT COMPLICATION: ICD-10-CM

## 2023-09-19 RX ORDER — BUDESONIDE AND FORMOTEROL FUMARATE DIHYDRATE 160; 4.5 UG/1; UG/1
2 AEROSOL RESPIRATORY (INHALATION) 2 TIMES DAILY
Qty: 10.2 G | Refills: 2 | Status: SHIPPED | OUTPATIENT
Start: 2023-09-19

## 2023-09-19 NOTE — TELEPHONE ENCOUNTER
Asthma & COPD Medication Protocol [id number]/2023 02:18 AM    Appointment in past 6 or next 3 months    Asthma Action Score greater than or equal to 20    AAP/ACT given in last 12 months       No future visits scheduled    Last visit 3/17/23

## 2023-11-23 ENCOUNTER — HOSPITAL ENCOUNTER (EMERGENCY)
Facility: HOSPITAL | Age: 28
Discharge: HOME | End: 2023-11-23
Attending: EMERGENCY MEDICINE
Payer: MEDICAID

## 2023-11-23 VITALS
OXYGEN SATURATION: 98 % | DIASTOLIC BLOOD PRESSURE: 96 MMHG | HEART RATE: 80 BPM | RESPIRATION RATE: 22 BRPM | TEMPERATURE: 97 F | SYSTOLIC BLOOD PRESSURE: 159 MMHG

## 2023-11-23 DIAGNOSIS — S05.01XA ABRASION OF RIGHT CORNEA, INITIAL ENCOUNTER: Primary | ICD-10-CM

## 2023-11-23 PROCEDURE — 99283 EMERGENCY DEPT VISIT LOW MDM: CPT

## 2023-11-23 RX ORDER — ERYTHROMYCIN 5 MG/G
1 OINTMENT OPHTHALMIC EVERY 6 HOURS
Qty: 3.5 G | Refills: 1 | Status: SHIPPED | OUTPATIENT
Start: 2023-11-23 | End: 2023-11-30

## 2023-11-23 RX ORDER — ERYTHROMYCIN 5 MG/G
1 OINTMENT OPHTHALMIC ONCE
Status: DISCONTINUED | OUTPATIENT
Start: 2023-11-23 | End: 2023-11-23

## 2023-11-23 RX ORDER — ERYTHROMYCIN 5 MG/G
1 OINTMENT OPHTHALMIC ONCE
Status: COMPLETED | OUTPATIENT
Start: 2023-11-23 | End: 2023-11-23

## 2023-12-15 DIAGNOSIS — J45.30 MILD PERSISTENT ASTHMA WITHOUT COMPLICATION: ICD-10-CM

## 2023-12-17 RX ORDER — BUDESONIDE AND FORMOTEROL FUMARATE DIHYDRATE 160; 4.5 UG/1; UG/1
2 AEROSOL RESPIRATORY (INHALATION) 2 TIMES DAILY
Qty: 10.2 G | Refills: 2 | Status: SHIPPED | OUTPATIENT
Start: 2023-12-17

## 2024-02-11 DIAGNOSIS — J45.30 MILD PERSISTENT ASTHMA WITHOUT COMPLICATION: ICD-10-CM

## 2024-02-13 RX ORDER — BUDESONIDE AND FORMOTEROL FUMARATE DIHYDRATE 160; 4.5 UG/1; UG/1
2 AEROSOL RESPIRATORY (INHALATION) 2 TIMES DAILY
Qty: 10.2 G | Refills: 2 | Status: SHIPPED | OUTPATIENT
Start: 2024-02-13

## 2024-02-13 NOTE — TELEPHONE ENCOUNTER
Requested Prescriptions     Pending Prescriptions Disp Refills    BUDESONIDE-FORMOTEROL FUMARATE 160-4.5 MCG/ACT Inhalation Aerosol [Pharmacy Med Name: BUDESONIDE/FORM 160/4.5MCG(120 INH)] 10.2 g 2     Sig: INHALE 2 PUFFS INTO THE LUNGS TWICE DAILY       LOV:  3---asthma/physical    LAST CPE: 3---asthma/physical    Last Labs: -overdue     Last Refill: 12-- 10.2 g with 2 refills

## 2024-05-06 DIAGNOSIS — J45.30 MILD PERSISTENT ASTHMA WITHOUT COMPLICATION (HCC): ICD-10-CM

## 2024-05-08 ENCOUNTER — OFFICE VISIT (OUTPATIENT)
Dept: PODIATRY CLINIC | Facility: CLINIC | Age: 29
End: 2024-05-08
Payer: MEDICAID

## 2024-05-08 ENCOUNTER — TELEPHONE (OUTPATIENT)
Dept: INTERNAL MEDICINE CLINIC | Facility: CLINIC | Age: 29
End: 2024-05-08

## 2024-05-08 VITALS — SYSTOLIC BLOOD PRESSURE: 130 MMHG | DIASTOLIC BLOOD PRESSURE: 90 MMHG

## 2024-05-08 DIAGNOSIS — M79.675 TOE PAIN, LEFT: ICD-10-CM

## 2024-05-08 DIAGNOSIS — Z13.228 SCREENING FOR METABOLIC DISORDER: ICD-10-CM

## 2024-05-08 DIAGNOSIS — L60.3 NAIL DYSTROPHY: ICD-10-CM

## 2024-05-08 DIAGNOSIS — B35.1 ONYCHOMYCOSIS: Primary | ICD-10-CM

## 2024-05-08 DIAGNOSIS — Z13.0 SCREENING FOR DISORDER OF BLOOD AND BLOOD-FORMING ORGANS: ICD-10-CM

## 2024-05-08 DIAGNOSIS — Z13.220 SCREENING FOR LIPID DISORDERS: ICD-10-CM

## 2024-05-08 DIAGNOSIS — Z00.00 ROUTINE GENERAL MEDICAL EXAMINATION AT A HEALTH CARE FACILITY: Primary | ICD-10-CM

## 2024-05-08 PROCEDURE — 11750 EXCISION NAIL&NAIL MATRIX: CPT | Performed by: STUDENT IN AN ORGANIZED HEALTH CARE EDUCATION/TRAINING PROGRAM

## 2024-05-08 PROCEDURE — 99203 OFFICE O/P NEW LOW 30 MIN: CPT | Performed by: STUDENT IN AN ORGANIZED HEALTH CARE EDUCATION/TRAINING PROGRAM

## 2024-05-08 RX ORDER — CEPHALEXIN 500 MG/1
500 CAPSULE ORAL 2 TIMES DAILY
Qty: 14 CAPSULE | Refills: 0 | Status: SHIPPED | OUTPATIENT
Start: 2024-05-08

## 2024-05-08 RX ORDER — BUDESONIDE AND FORMOTEROL FUMARATE DIHYDRATE 160; 4.5 UG/1; UG/1
2 AEROSOL RESPIRATORY (INHALATION) 2 TIMES DAILY
Qty: 10.2 G | Refills: 0 | Status: SHIPPED | OUTPATIENT
Start: 2024-05-08

## 2024-05-08 NOTE — TELEPHONE ENCOUNTER
Please review.  Protocol failed / Has no protocol.    [employer] message sent to patient to schedule an office visit with Primary Care Provider.      Requested Prescriptions   Pending Prescriptions Disp Refills    Budesonide-Formoterol Fumarate (SYMBICORT) 160-4.5 MCG/ACT Inhalation Aerosol 10.2 g 0     Sig: Inhale 2 puffs into the lungs 2 (two) times daily. **Appointment needed for further refills.       Asthma & COPD Medication Protocol Failed - 5/6/2024  1:12 PM        Failed - Asthma Action Score greater than or equal to 20        Failed - Appointment in past 6 or next 3 months      Recent Outpatient Visits              1 year ago Mild persistent asthma without complication (HCC)    [address] Asaf An MD    Office Visit    3 years ago Mild persistent asthma without complication (HCC)    [address] Schriedel, Adam, MD    Office Visit    3 years ago PE (physical exam), annual    [address] Schriedel, Adam, MD    Office Visit    3 years ago Shortness of breath    [facility],  [facility] Darren Perdomo MD    Office Visit    5 years ago Hematochezia    [facility],  [facility] Darren Perdomo MD    Office Visit          Future Appointments         Provider Department Appt Notes    Today Angus Mcnair DPM [facility] painful toe nail                    Failed - AAP/ACT given in last 12 months     No data recorded  No data recorded  No data recorded  No data recorded             Future Appointments         Provider Department Appt Notes    Today Angus Mcnair DPM [facility] painful toe nail          Recent Outpatient Visits              1 year ago Mild persistent asthma without complication (HCC)    [address]  Asaf An MD    Office Visit    3 years ago Mild persistent asthma without complication (HCC)    [facility], [address] [place] [address], [place] Schriedel, Adam, MD    Office Visit    3 years ago PE (physical exam), annual    [facility], [address] [place] [address], [place] Schriedel, Adam, MD    Office Visit    3 years ago Shortness of breath    [facility] Darren Perdomo MD    Office Visit    5 years ago Hematochezia    [facility] Darren Perdomo MD    Office Visit

## 2024-05-08 NOTE — PROGRESS NOTES
Per Dr block to draw up .2.5ml of 1% Lidocaine and 2.5ml marcaine 0.5% for a Left hallux Ingrown  Toenail Procedure.

## 2024-05-08 NOTE — TELEPHONE ENCOUNTER
Patient called request labs prior to their annual physical.    Annual physical scheduled for 5/23/24   Please order labs. Patient preferred lab is [facility].    Patient informed request was sent to clinical team.

## 2024-05-12 NOTE — PROGRESS NOTES
[facility] Podiatry  Progress Note    Simba Garza is a 29 year old male.   Chief Complaint   Patient presents with    Toe Pain     Consult- L hallux- stated In 2007 a 45lb plate dropped to his toe and also had nail removed 2x by diff Podiatrist- rates pain 3-6/10 most of the time         HPI:     Patient is a pleasant 29-year-old male who presents to clinic for evaluation of pain to his left hallux nail.  Back in 2007 he had a 45 pound weight fall on his foot.  His nails been removed by several different podiatrist but continues to regrow.  He is hoping to have site permanently removed at this time.  He rates pain at a 3-6 out of 10 due to impaction and incurvation associated with the nail.  No other complaints are mentioned.      Allergies: Patient has no known allergies.   Current Outpatient Medications   Medication Sig Dispense Refill    cephalexin (KEFLEX) 500 MG Oral Cap Take 1 capsule (500 mg total) by mouth 2 (two) times daily. 14 capsule 0    Budesonide-Formoterol Fumarate (SYMBICORT) 160-4.5 MCG/ACT Inhalation Aerosol Inhale 2 puffs into the lungs 2 (two) times daily. **Appointment needed for further refills. 10.2 g 0    albuterol 108 (90 Base) MCG/ACT Inhalation Aero Soln Inhale 2 puffs into the lungs every 4 (four) hours as needed for Wheezing. 18 g 0    omeprazole 20 MG Oral Capsule Delayed Release Take 1 capsule (20 mg total) by mouth 2 (two) times daily before meals.      diazepam 5 MG Oral Tab Take 1 tablet (5 mg total) by mouth every 12 (twelve) hours as needed for Anxiety.        Past Medical History:    Abdominal pain    Asthma (HCC)    Blood in the stool    Flatulence/gas pain/belching    History of 2019 novel coronavirus disease (COVID-19)    fatigue, SOB, Fever, body ache    Pain with bowel movements      History reviewed. No pertinent surgical history.   History reviewed. No pertinent family history.   Social History     Socioeconomic History    Marital status: Single   Tobacco Use     Smoking status: Never    Smokeless tobacco: Never   Vaping Use    Vaping status: Never Used   Substance and Sexual Activity    Alcohol use: Not Currently     Alcohol/week: 15.0 standard drinks of alcohol     Types: 15 Standard drinks or equivalent per week    Drug use: No           REVIEW OF SYSTEMS:     Today reviewed systems as documented below  GENERAL HEALTH: feels well otherwise  SKIN: denies any unusual skin lesions or rashes  RESPIRATORY: denies shortness of breath with exertion  CARDIOVASCULAR: denies chest pain on exertion  GI: denies abdominal pain and denies heartburn  NEURO: denies headaches  MUSCULO: denies arthritis, back pain      EXAM:   /90 (BP Location: Right arm, Patient Position: Sitting, Cuff Size: large)   GENERAL: well developed, well nourished, in no apparent distress  EXTREMITIES:   1. Integument: Normal skin temperature and turgor.  Pain and incurvation associated with thickened left hallux nail.    2. Vascular: Dorsalis pedis two out of four bilateral and posterior tibial pulses two out of   four bilateral, capillary refill normal.   3. Musculoskeletal: All muscle groups are graded 5 out of 5 in the foot and ankle.Pain noted to left great toe/nail.   4. Neurological: Normal sharp dull sensation; reflexes normal.          ASSESSMENT AND PLAN:   Diagnoses and all orders for this visit:    Onychomycosis  -     cephalexin (KEFLEX) 500 MG Oral Cap; Take 1 capsule (500 mg total) by mouth 2 (two) times daily.    Nail dystrophy    Toe pain, left        Plan:    -Patient examined, chart history reviewed.  -Discussed etiology of patient's condition and various treatment options.  -Patient is interested in nail matrixectomy to his left great toe.  -Discussed procedure in detail with patient  including benefits, risks, and recovery period.  Patient agreeable with procedure and written consent was obtained.  Patient has had recurrence after removal of several providers.  He understands that we  will apply a little bit extra phenol to hopefully help prevent recurrence.  He does understand this may irritate site marker put him at increased risk for infection.    Procedure as follows:  After prepping site with alcohol, administered local infiltrative block to left hallux consisting of 5 cc of 1:1 mixture of 0.5% Marcaine plain and 1% lidocaine plain.  After sufficient anesthesia was achieved, the digit was prepped with Betadine.  Next, using a hemostat, the entire left hallux nail was freed.  A hemostat was once again used to remove the nail in its entirety.  Next, digital tourniquet was applied.  Surrounding skin was prepped with bacitracin.  The nail matrix was curetted free with a dermal curette.  Next, 3 x 60-second applications of 89% phenol were applied to the nailbed.  The site was then copiously irrigated with saline and patted dry.  The digital tourniquet was removed and prompt hyperemic response was noted to the digit.  The site was dressed with bacitracin, gauze, and mildly compressive Coban wrap.  The patient tolerated the procedure well and there were no complications.      -All soaking and aftercare instructions were discussed with the patient.  Informational handout was dispensed.  -Rx Keflex.  -Educated patient on acute signs of infection advised patient to seek immediate medical attention if any concerns arise.     The patient indicates understanding of these issues and agrees to the plan.    RTC 1 to 2 weeks.    Angus Mcnair DPM    Dragon speech recognition software was used to prepare this note.  Errors in word recognition may occur.  Please contact me with any questions/concerns with this note.

## 2024-05-22 ENCOUNTER — OFFICE VISIT (OUTPATIENT)
Dept: PODIATRY CLINIC | Facility: CLINIC | Age: 29
End: 2024-05-22

## 2024-05-22 DIAGNOSIS — L60.3 NAIL DYSTROPHY: ICD-10-CM

## 2024-05-22 DIAGNOSIS — B35.1 ONYCHOMYCOSIS: Primary | ICD-10-CM

## 2024-05-22 DIAGNOSIS — M79.675 TOE PAIN, LEFT: ICD-10-CM

## 2024-05-23 ENCOUNTER — OFFICE VISIT (OUTPATIENT)
Dept: INTERNAL MEDICINE CLINIC | Facility: CLINIC | Age: 29
End: 2024-05-23

## 2024-05-23 VITALS
SYSTOLIC BLOOD PRESSURE: 130 MMHG | DIASTOLIC BLOOD PRESSURE: 88 MMHG | TEMPERATURE: 97 F | HEART RATE: 91 BPM | OXYGEN SATURATION: 98 % | BODY MASS INDEX: 35 KG/M2 | WEIGHT: 279 LBS

## 2024-05-23 DIAGNOSIS — Z00.00 WELLNESS EXAMINATION: Primary | ICD-10-CM

## 2024-05-23 DIAGNOSIS — K21.00 GASTROESOPHAGEAL REFLUX DISEASE WITH ESOPHAGITIS WITHOUT HEMORRHAGE: ICD-10-CM

## 2024-05-23 DIAGNOSIS — K42.9 UMBILICAL HERNIA WITHOUT OBSTRUCTION AND WITHOUT GANGRENE: ICD-10-CM

## 2024-05-23 DIAGNOSIS — L60.3 NAIL DYSTROPHY: ICD-10-CM

## 2024-05-23 DIAGNOSIS — K76.0 FATTY LIVER: ICD-10-CM

## 2024-05-23 DIAGNOSIS — J45.30 MILD PERSISTENT ASTHMA WITHOUT COMPLICATION (HCC): ICD-10-CM

## 2024-05-23 DIAGNOSIS — F41.0 PANIC DISORDER: ICD-10-CM

## 2024-05-23 PROCEDURE — 99395 PREV VISIT EST AGE 18-39: CPT | Performed by: FAMILY MEDICINE

## 2024-05-23 RX ORDER — ALBUTEROL SULFATE 90 UG/1
2 AEROSOL, METERED RESPIRATORY (INHALATION) EVERY 4 HOURS PRN
Qty: 18 G | Refills: 0 | Status: SHIPPED | OUTPATIENT
Start: 2024-05-23

## 2024-05-23 RX ORDER — BUDESONIDE AND FORMOTEROL FUMARATE DIHYDRATE 160; 4.5 UG/1; UG/1
2 AEROSOL RESPIRATORY (INHALATION) 2 TIMES DAILY
Qty: 10.2 G | Refills: 0 | Status: SHIPPED | OUTPATIENT
Start: 2024-05-23

## 2024-05-23 NOTE — PROGRESS NOTES
Simba Garza  1/7/1995    Chief Complaint   Patient presents with    Physical       HPI:   Simba Garza is a 29 year old male who presents for CPE. He was not able to completed his labs. He did have a great toenail removal with Dr. Mcnair. He has been trying to wean off his diazepam. Has been consistent with Omeprazole.     Current Outpatient Medications   Medication Sig Dispense Refill    Budesonide-Formoterol Fumarate (SYMBICORT) 160-4.5 MCG/ACT Inhalation Aerosol Inhale 2 puffs into the lungs 2 (two) times daily. **Appointment needed for further refills. 10.2 g 0    cephalexin (KEFLEX) 500 MG Oral Cap Take 1 capsule (500 mg total) by mouth 2 (two) times daily. 14 capsule 0    albuterol 108 (90 Base) MCG/ACT Inhalation Aero Soln Inhale 2 puffs into the lungs every 4 (four) hours as needed for Wheezing. 18 g 0    omeprazole 20 MG Oral Capsule Delayed Release Take 1 capsule (20 mg total) by mouth 2 (two) times daily before meals.      diazepam 5 MG Oral Tab Take 1 tablet (5 mg total) by mouth every 12 (twelve) hours as needed for Anxiety.        No Known Allergies   Past Medical History:    Abdominal pain    Asthma (HCC)    Blood in the stool    Flatulence/gas pain/belching    History of 2019 novel coronavirus disease (COVID-19)    fatigue, SOB, Fever, body ache    Pain with bowel movements      Patient Active Problem List   Diagnosis    Dermatophytosis of nail    Dyspnea    Mild persistent asthma without complication (HCC)    Panic disorder    Gastroesophageal reflux disease with esophagitis without hemorrhage      History reviewed. No pertinent surgical history.   History reviewed. No pertinent family history.   Social History     Socioeconomic History    Marital status: Single   Tobacco Use    Smoking status: Never    Smokeless tobacco: Never   Vaping Use    Vaping status: Never Used   Substance and Sexual Activity    Alcohol use: Not Currently     Alcohol/week: 15.0 standard drinks of alcohol     Types: 15  Standard drinks or equivalent per week    Drug use: No         REVIEW OF SYSTEMS:   GENERAL: feels well otherwise  SKIN: no rash  EYES: no vision changes   HEENT: not congested  LUNGS: no dyspnea, no recent asthma exacerbations   CARDIOVASCULAR: no chest pain on exertion  GI: no new abdominal pain, endorses umbilical hernia     EXAM:   /88 (BP Location: Left arm, Patient Position: Sitting, Cuff Size: large)   Pulse 91   Temp 97 °F (36.1 °C) (Temporal)   Wt 279 lb (126.6 kg)   SpO2 98%   BMI 34.87 kg/m²   GENERAL: Well developed, well nourished,in no apparent distress  SKIN: No rash  EYES: PERRLA, EOMI, conjunctiva are clear  HEENT: atraumatic, normocephalic,ears and throat are clear  NECK: supple,no adenopathy,no bruits  LUNGS: clear to auscultation  CARDIO: RRR without murmur  GI: soft, small reducible umbilical hernia     ASSESSMENT AND PLAN:   Simba Garza is a 29 year old male who presents for CPE    Wellness examination  Discussed age-appropriate with wellness.  Continue emphasis on healthy diet and regular exercise. Labs ordered and will be completed in the next few weeks.     Mild persistent asthma without complication (HCC)  Controlled on current treatment. No recent exacerbations. Refills provided.   - Budesonide-Formoterol Fumarate (SYMBICORT) 160-4.5 MCG/ACT Inhalation Aerosol; Inhale 2 puffs into the lungs 2 (two) times daily.  Dispense: 10.2 g; Refill: 0  - albuterol 108 (90 Base) MCG/ACT Inhalation Aero Soln; Inhale 2 puffs into the lungs every 4 (four) hours as needed for Wheezing.  Dispense: 18 g; Refill: 0    Nail dystrophy  S/P nail matrixectomy     Panic disorder  Following with psychiatry, working on weaning off Diazepam    Gastroesophageal reflux disease with esophagitis without hemorrhage  Continue PPI, stable.     Umbilical hernia without obstruction and without gangrene  Recommend evaluation with general surgery  - Surgery Referral - [facility] ([place])    Fatty Liver  Continue  emphasis on healthy diet and exercise.  He will complete his LFTs within the next 2 weeks.    All questions were answered and the patient agrees with the plan.     Thank you,  Asaf An MD

## 2024-05-27 NOTE — PROGRESS NOTES
[facility]  Progress Note    Simba Garza is a 29 year old male.   Chief Complaint   Patient presents with    Follow - Up     Left hallux follow up- states feels good - No redness or drainage         HPI:     Patient is a pleasant 29-year-old male who presents to clinic for follow-up status post matrixectomy left great toe.  He relates that he is doing well and without pain.  He has been performing soaking aftercare as advised.  No other complaints are mentioned.        Allergies: Patient has no known allergies.   Current Outpatient Medications   Medication Sig Dispense Refill    cephalexin (KEFLEX) 500 MG Oral Cap Take 1 capsule (500 mg total) by mouth 2 (two) times daily. 14 capsule 0    omeprazole 20 MG Oral Capsule Delayed Release Take 1 capsule (20 mg total) by mouth 2 (two) times daily before meals.      diazepam 5 MG Oral Tab Take 1 tablet (5 mg total) by mouth every 12 (twelve) hours as needed for Anxiety.      Budesonide-Formoterol Fumarate (SYMBICORT) 160-4.5 MCG/ACT Inhalation Aerosol Inhale 2 puffs into the lungs 2 (two) times daily. **Appointment needed for further refills. 10.2 g 0    albuterol 108 (90 Base) MCG/ACT Inhalation Aero Soln Inhale 2 puffs into the lungs every 4 (four) hours as needed for Wheezing. 18 g 0      Past Medical History:    Abdominal pain    Asthma (HCC)    Blood in the stool    Flatulence/gas pain/belching    History of 2019 novel coronavirus disease (COVID-19)    fatigue, SOB, Fever, body ache    Pain with bowel movements      History reviewed. No pertinent surgical history.   History reviewed. No pertinent family history.   Social History     Socioeconomic History    Marital status: Single   Tobacco Use    Smoking status: Never    Smokeless tobacco: Never   Vaping Use    Vaping status: Never Used   Substance and Sexual Activity    Alcohol use: Not Currently     Alcohol/week: 15.0 standard drinks of alcohol     Types: 15 Standard drinks or equivalent per week     Drug use: No           REVIEW OF SYSTEMS:     Today reviewed systems as documented below  GENERAL HEALTH: feels well otherwise  SKIN: denies any unusual skin lesions or rashes  RESPIRATORY: denies shortness of breath with exertion  CARDIOVASCULAR: denies chest pain on exertion  GI: denies abdominal pain and denies heartburn  NEURO: denies headaches  MUSCULO: denies arthritis, back pain      EXAM:   There were no vitals taken for this visit.  GENERAL: well developed, well nourished, in no apparent distress  EXTREMITIES:   1. Integument: Normal skin temperature and turgor.  Minor eschar to nail matrixectomy site to left hallux nail.  No acute signs of infection or other concerns.  2. Vascular: Dorsalis pedis two out of four bilateral and posterior tibial pulses two out of   four bilateral, capillary refill normal.   3. Musculoskeletal: All muscle groups are graded 5 out of 5 in the foot and ankle. No pain.   4. Neurological: Normal sharp dull sensation; reflexes normal.          ASSESSMENT AND PLAN:   Diagnoses and all orders for this visit:    Onychomycosis    Nail dystrophy    Toe pain, left        Plan:    -Patient examined, chart history reviewed.  -Discussed etiology of patient's condition and various treatment options.  -Inspected site of nail matrixectomy left great toe.  Noted to be mostly well-healed without concerns.  Minimal HPK lifted off to healthy tissue without incident.  Continue soaking aftercare for another 3 to 5 days.  Today site was dressed bacitracin and Band-Aid.  Can follow-up as needed if any pain, acute signs infection, recurrence, or other concerns arise.  -Appreciate the opportunity to participate in patient's care.    Angus Mcnair DPM    Dragon speech recognition software was used to prepare this note.  Errors in word recognition may occur.  Please contact me with any questions/concerns with this note.

## 2024-05-31 DIAGNOSIS — J45.30 MILD PERSISTENT ASTHMA WITHOUT COMPLICATION (HCC): ICD-10-CM

## 2024-05-31 RX ORDER — BUDESONIDE AND FORMOTEROL FUMARATE DIHYDRATE 160; 4.5 UG/1; UG/1
2 AEROSOL RESPIRATORY (INHALATION) 2 TIMES DAILY
Qty: 10.2 G | Refills: 0 | Status: CANCELLED | OUTPATIENT
Start: 2024-05-31

## 2024-06-04 RX ORDER — OMEPRAZOLE 20 MG/1
20 CAPSULE, DELAYED RELEASE ORAL
Qty: 180 CAPSULE | Refills: 3 | Status: SHIPPED | OUTPATIENT
Start: 2024-06-04

## 2024-06-04 RX ORDER — BUDESONIDE AND FORMOTEROL FUMARATE DIHYDRATE 160; 4.5 UG/1; UG/1
2 AEROSOL RESPIRATORY (INHALATION) 2 TIMES DAILY
Qty: 30.6 G | Refills: 3 | Status: SHIPPED | OUTPATIENT
Start: 2024-06-04

## 2024-06-04 NOTE — TELEPHONE ENCOUNTER
Please review:    Medication is listed as patient reported.    Requested Prescriptions   Pending Prescriptions Disp Refills    omeprazole 20 MG Oral Capsule Delayed Release 180 capsule 3     Sig: Take 1 capsule (20 mg total) by mouth 2 (two) times daily before meals.       Gastrointestional Medication Protocol Passed - 6/4/2024 11:46 AM        Passed - In person appointment or virtual visit in the past 12 mos or appointment in next 3 mos     Recent Outpatient Visits              1 week ago [address]    [place] Visit    [place] [place] [facility]    Office Visit    3 weeks ago [place]osis    [place] [facility]    Office Visit    1 year ago Mild persistent asthma without complication (HCC)    [address]    Office Visit    3 years ago Mild persistent asthma without complication (HCC)    [address] [place] Schriedel, Adam, MD    Office Visit                       Signed Prescriptions Disp Refills    Budesonide-Formoterol Fumarate (SYMBICORT) 160-4.5 MCG/ACT Inhalation Aerosol 30.6 g 3     Sig: Inhale 2 puffs into the lungs 2 (two) times daily.       Asthma & COPD Medication Protocol Passed - 6/4/2024 11:46 AM        Passed - Asthma Action Score greater than or equal to 20        Passed - Appointment in past 6 or next 3 months      Recent Outpatient Visits              1 week ago Wellness examination    [address]    Office Visit    1 week ago [place]ychomycosis    [place] [facility] [place] [facility]    Office Visit    3 weeks ago Onychomycosis    [facility]    [facility] Visit    1 year ago Mild persistent asthma without complication (HCC)    [facility], [address] [place], [place] [address]Olivia Michael, MD    Office Visit    3 years ago Mild persistent asthma without complication (HCC)    [facility], [address] [place] [address], [place] Schriedel, Adam, MD    Office Visit                      Passed - AAP/ACT given in last 12 months     Yes  Yes  Yes  25                 Recent Outpatient Visits              1 week ago Wellness examination    [facility], [address] [place], [place] [address]Olivia Michael, MD    Office Visit    1 week ago Onychomycosis    [facility]    [facility]    [facility]    [facility]    1 year ago Mild persistent asthma without complication (HCC)    [facility], [address] [place], [place] [address]Olivia Michael, MD    Office Visit    3 years ago Mild persistent asthma without complication (HCC)    [facility], [address] [place] [address], [place] Schriedel, Adam, MD    Office Visit

## 2024-06-04 NOTE — TELEPHONE ENCOUNTER
Per office visit with Dr. An on 05/23/2024:  \"Has been consistent with Omeprazole.\"     \"Gastroesophageal reflux disease with esophagitis without hemorrhage  Continue PPI, stable.\"

## 2024-10-28 ENCOUNTER — TELEPHONE (OUTPATIENT)
Dept: FAMILY MEDICINE CLINIC | Facility: CLINIC | Age: 29
End: 2024-10-28

## 2024-10-28 NOTE — TELEPHONE ENCOUNTER
Omeprazole 20mg: Script was sent to [facility] on 06/04/2024 for 1 year supply.    Prior Authorization is coming from [facility] in [place]- Script was transferred from [facility] to [facility].

## 2024-11-20 ENCOUNTER — TELEPHONE (OUTPATIENT)
Dept: INTERNAL MEDICINE CLINIC | Facility: CLINIC | Age: 29
End: 2024-11-20

## 2024-11-20 ENCOUNTER — LAB ENCOUNTER (OUTPATIENT)
Dept: LAB | Age: 29
End: 2024-11-20
Attending: INTERNAL MEDICINE
Payer: MEDICAID

## 2024-11-20 ENCOUNTER — OFFICE VISIT (OUTPATIENT)
Dept: INTERNAL MEDICINE CLINIC | Facility: CLINIC | Age: 29
End: 2024-11-20
Payer: MEDICAID

## 2024-11-20 VITALS
RESPIRATION RATE: 22 BRPM | BODY MASS INDEX: 37.29 KG/M2 | SYSTOLIC BLOOD PRESSURE: 124 MMHG | WEIGHT: 287.5 LBS | DIASTOLIC BLOOD PRESSURE: 82 MMHG | HEART RATE: 87 BPM | HEIGHT: 73.5 IN

## 2024-11-20 DIAGNOSIS — K21.00 GASTROESOPHAGEAL REFLUX DISEASE WITH ESOPHAGITIS WITHOUT HEMORRHAGE: ICD-10-CM

## 2024-11-20 DIAGNOSIS — Z13.0 SCREENING FOR DISORDER OF BLOOD AND BLOOD-FORMING ORGANS: ICD-10-CM

## 2024-11-20 DIAGNOSIS — M54.50 CHRONIC BILATERAL LOW BACK PAIN WITHOUT SCIATICA: ICD-10-CM

## 2024-11-20 DIAGNOSIS — Z13.228 SCREENING FOR METABOLIC DISORDER: ICD-10-CM

## 2024-11-20 DIAGNOSIS — Z51.81 THERAPEUTIC DRUG MONITORING: Primary | ICD-10-CM

## 2024-11-20 DIAGNOSIS — Z13.220 SCREENING FOR LIPID DISORDERS: ICD-10-CM

## 2024-11-20 DIAGNOSIS — E66.812 OBESITY, CLASS II, BMI 35-39.9: ICD-10-CM

## 2024-11-20 DIAGNOSIS — Z00.00 ROUTINE GENERAL MEDICAL EXAMINATION AT A HEALTH CARE FACILITY: ICD-10-CM

## 2024-11-20 DIAGNOSIS — Z51.81 THERAPEUTIC DRUG MONITORING: ICD-10-CM

## 2024-11-20 DIAGNOSIS — G89.29 CHRONIC BILATERAL LOW BACK PAIN WITHOUT SCIATICA: ICD-10-CM

## 2024-11-20 LAB
ALBUMIN SERPL-MCNC: 4.6 G/DL (ref 3.2–4.8)
ALBUMIN/GLOB SERPL: 1.6 {RATIO} (ref 1–2)
ALP LIVER SERPL-CCNC: 55 U/L
ALT SERPL-CCNC: 124 U/L
ANION GAP SERPL CALC-SCNC: 7 MMOL/L (ref 0–18)
AST SERPL-CCNC: 73 U/L (ref ?–34)
BASOPHILS # BLD AUTO: 0.04 X10(3) UL (ref 0–0.2)
BASOPHILS NFR BLD AUTO: 0.5 %
BILIRUB SERPL-MCNC: 0.8 MG/DL (ref 0.3–1.2)
BUN BLD-MCNC: 14 MG/DL (ref 9–23)
CALCIUM BLD-MCNC: 9.8 MG/DL (ref 8.7–10.4)
CHLORIDE SERPL-SCNC: 104 MMOL/L (ref 98–112)
CHOLEST SERPL-MCNC: 120 MG/DL (ref ?–200)
CO2 SERPL-SCNC: 25 MMOL/L (ref 21–32)
CREAT BLD-MCNC: 1.08 MG/DL
EGFRCR SERPLBLD CKD-EPI 2021: 94 ML/MIN/1.73M2 (ref 60–?)
EOSINOPHIL # BLD AUTO: 0.04 X10(3) UL (ref 0–0.7)
EOSINOPHIL NFR BLD AUTO: 0.5 %
ERYTHROCYTE [DISTWIDTH] IN BLOOD BY AUTOMATED COUNT: 12.7 %
EST. AVERAGE GLUCOSE BLD GHB EST-MCNC: 103 MG/DL (ref 68–126)
FASTING PATIENT LIPID ANSWER: YES
FASTING STATUS PATIENT QL REPORTED: YES
FOLATE SERPL-MCNC: 9 NG/ML (ref 5.4–?)
GLOBULIN PLAS-MCNC: 2.9 G/DL (ref 2–3.5)
GLUCOSE BLD-MCNC: 90 MG/DL (ref 70–99)
HBA1C MFR BLD: 5.2 % (ref ?–5.7)
HCT VFR BLD AUTO: 46.7 %
HDLC SERPL-MCNC: 41 MG/DL (ref 40–59)
HGB BLD-MCNC: 15.7 G/DL
IMM GRANULOCYTES # BLD AUTO: 0.02 X10(3) UL (ref 0–1)
IMM GRANULOCYTES NFR BLD: 0.2 %
LDLC SERPL CALC-MCNC: 62 MG/DL (ref ?–100)
LYMPHOCYTES # BLD AUTO: 1.8 X10(3) UL (ref 1–4)
LYMPHOCYTES NFR BLD AUTO: 21 %
MCH RBC QN AUTO: 27.8 PG (ref 26–34)
MCHC RBC AUTO-ENTMCNC: 33.6 G/DL (ref 31–37)
MCV RBC AUTO: 82.7 FL
MONOCYTES # BLD AUTO: 0.65 X10(3) UL (ref 0.1–1)
MONOCYTES NFR BLD AUTO: 7.6 %
NEUTROPHILS # BLD AUTO: 6.02 X10 (3) UL (ref 1.5–7.7)
NEUTROPHILS # BLD AUTO: 6.02 X10(3) UL (ref 1.5–7.7)
NEUTROPHILS NFR BLD AUTO: 70.2 %
NONHDLC SERPL-MCNC: 79 MG/DL (ref ?–130)
OSMOLALITY SERPL CALC.SUM OF ELEC: 282 MOSM/KG (ref 275–295)
PLATELET # BLD AUTO: 250 10(3)UL (ref 150–450)
POTASSIUM SERPL-SCNC: 4.4 MMOL/L (ref 3.5–5.1)
PROT SERPL-MCNC: 7.5 G/DL (ref 5.7–8.2)
RBC # BLD AUTO: 5.65 X10(6)UL
SODIUM SERPL-SCNC: 136 MMOL/L (ref 136–145)
TRIGL SERPL-MCNC: 87 MG/DL (ref 30–149)
TSI SER-ACNC: 2.4 UIU/ML (ref 0.55–4.78)
VIT B12 SERPL-MCNC: 609 PG/ML (ref 211–911)
VIT D+METAB SERPL-MCNC: 25.2 NG/ML (ref 30–100)
VLDLC SERPL CALC-MCNC: 13 MG/DL (ref 0–30)
WBC # BLD AUTO: 8.6 X10(3) UL (ref 4–11)

## 2024-11-20 PROCEDURE — 99204 OFFICE O/P NEW MOD 45 MIN: CPT | Performed by: INTERNAL MEDICINE

## 2024-11-20 PROCEDURE — 82746 ASSAY OF FOLIC ACID SERUM: CPT

## 2024-11-20 PROCEDURE — 80061 LIPID PANEL: CPT

## 2024-11-20 PROCEDURE — 83036 HEMOGLOBIN GLYCOSYLATED A1C: CPT

## 2024-11-20 PROCEDURE — 80053 COMPREHEN METABOLIC PANEL: CPT

## 2024-11-20 PROCEDURE — 82607 VITAMIN B-12: CPT

## 2024-11-20 PROCEDURE — 84443 ASSAY THYROID STIM HORMONE: CPT

## 2024-11-20 PROCEDURE — 82306 VITAMIN D 25 HYDROXY: CPT

## 2024-11-20 PROCEDURE — 85025 COMPLETE CBC W/AUTO DIFF WBC: CPT

## 2024-11-20 PROCEDURE — 36415 COLL VENOUS BLD VENIPUNCTURE: CPT

## 2024-11-20 NOTE — PROGRESS NOTES
HISTORY OF PRESENT ILLNESS  Chief Complaint   Patient presents with    Weight Problem     Referred By Family, open to medication       Simba Garza is a 29 year old male new to our office today for initiation of medical weight loss program.  Patient presents today with c/o excess weight.     Started playing hockey at age 2 and history of heavy training.   Was in [place] and had injuries , stopped at 17 and started gaining   At his highest weight was 309 lb , was drinking more heavily at that time.   Started stomach ulcer and liver issues   Has quit 6 years ago   Since that time was 309 to 165 lb on his own with walking and biking   Then started gaining weight from 220 onwards     Wt Readings from Last 6 Encounters:   11/20/24 287 lb 8 oz (130.4 kg)   05/23/24 279 lb (126.6 kg)   03/17/23 283 lb (128.4 kg)   04/07/21 250 lb 3.2 oz (113.5 kg)   03/10/21 252 lb 9.6 oz (114.6 kg)   12/30/20 250 lb (113.4 kg)              [facility] Initial Intake    General Information  Patient stated 6 month goal: Loose weight   Patient stated 1 year goal: Be healthier   Patient stated readiness to make a Lifestyle Change (0 = no desire; 10 =   extremely motivated): 10 Patient stated willingness to take medication(s)   for weight loss (0 = no interest; 10 = ready to start): 10 Patient stated   level of interest in bariatric surgery (0 = not interested; 10 = very   interested): 0   Patient agreement to achieve  weight loss: Show up for scheduled follow-up   appointments, timely complete tests as discussed and ordered (labs,   cardiac testing, sleep study and/or imaging), work toward goal of   exercising 30 minutes 5 days, 150 minutes/week, make an appointment with   [facility] dietmyra to assist in making dietary changes, take   medication as prescribed and contact clinic if any questions or concerns   regarding medication, reach the goal of losing 5-10% of total body weight   within 6 months of consultation, ask for clarification,  help or support   when needed and give myself credit for my accomplishments and patience   during this process.: I agree  Previous Weight Loss  Referral source to [facility]: Primary Care Provider  Patient stated previous weight loss history: Fasting no medications,   weight lifting and cardio  Patient stated eating behaviors/patterns that have been barriers to weight   loss success in the past: None  24 Hour Food Journal  Breakfast: None Lunch: None   Dinner: Sloppy jase ate 2, broccoli, corn Snacks: Crackers and chesse   Fluids: Water drink about a gallon of water every day, coffee    Lifestyle   Patient stated use of tobacco: No Patient stated # of alcoholic beverages   per week: 0   Patient stated supplements taken on a regular basis include: 0   Exercise   Patient stated exercises # days/week: 3 Patient stated perceived level of   exertion: 2 Patient stated average level of stress: 3   Patient stated activities: Taking walks, playing with my daughter outside.     Patient stated coping strategies: Box breathing, hobbies   Sleep   Patient stated # hours uninterrupted sleep: 8 Patient stated # times   awakened: 0 Patient stated feels restful: Yes   Patient stated snores: Yes Patient stated has sleep apnea: No Patient   stated uses sleep device: None                Breakfast Lunch Dinner Snacks Fluids   Reviewed 24 dietary recall            Social hx and lifestyle reviewed:    Work: works [employer] and does not get a lot of steps / working opposite schedules   Marital status: girlfriend   Support: good   Tobacco use: neg  ETOH use: neg  Supplements: none   Exercise: 3 days per week, walking   Stress level: 3/10  Sleep hours and integrity: 8     MEDICAL HISTORY  PMH reviewed:   Cardiac disorders:negative    Depression/anxiety: YES   Glaucoma: negative Kidney stones: negative   Eating disorder: negative   Migraines: negative   Seizures: negative   Joint-related conditions: low back pain   Liver disease:  negative   Renal disease: negative   Diabetes: negative  Thyroid disease: negative   Constipation: negative  DVT: negative    Family or personal history of Pancreatic issues / Medullary Thyroid Cancer: negative    History of bariatric surgery: negative     FMH reviewed: obesity in parent/s or sibling: YES     REVIEW OF SYSTEMS  GENERAL: feels well otherwise,   NECK: denies thickening   LUNGS: denies shortness of breath with exertion, no apnea   CARDIOVASCULAR: denies chest pain on exertion , denies palpitations or pedal edema  GI: denies abdominal pain.  No N/V/D/C  MUSCULOSKELETAL: denies joint pains   NEURO: denies headaches   PSYCH: denies change in behavior or mood, denies feeling sad or depressed     EXAM  /82   Pulse 87   Resp 22   Ht 6' 1.5\" (1.867 m)   Wt 287 lb 8 oz (130.4 kg)   BMI 37.42 kg/m² ,              GENERAL: well developed, well nourished, in no apparent distress  SKIN: warm, pink, dry without rashes to exposed area   EYES: conjunctiva pink, sclera non icteric, PERRL  HEENT: atraumatic, normocephalic, O/p: Mallampati score- 2  NECK: supple, non tender, no adenopathy, no thyromegaly,   LUNGS: CTA in all fields, breathing non labored  CARDIO: RRR without murmur, normal S1 and S2 without clicks or gallops, no pedal edema.GI: rotund, no masses, HSM or tenderness  MUSCULOSKELETAL: grossly intact   NEURO: Oriented times three, full ROM of bilateral UE/LE  PSYCH: pleasant, cooperative, normal mood and affect,     Lab Results   Component Value Date    WBC 8.6 11/20/2024    RBC 5.65 11/20/2024    HGB 15.7 11/20/2024    HCT 46.7 11/20/2024    MCV 82.7 11/20/2024    MCH 27.8 11/20/2024    MCHC 33.6 11/20/2024    RDW 12.7 11/20/2024    .0 11/20/2024    MPV 11.8 (H) 07/05/2012     Lab Results   Component Value Date    GLU 90 11/20/2024    BUN 14 11/20/2024    BUNCREA 10.5 12/30/2020    CREATSERUM 1.08 11/20/2024    ANIONGAP 7 11/20/2024    GFRNAA 89 10/14/2021    GFRAA 103 10/14/2021    CA  9.8 11/20/2024    OSMOCALC 282 11/20/2024    ALKPHO 55 11/20/2024    AST 73 (H) 11/20/2024     (H) 11/20/2024    BILT 0.8 11/20/2024    TP 7.5 11/20/2024    ALB 4.6 11/20/2024    GLOBULIN 2.9 11/20/2024     11/20/2024    K 4.4 11/20/2024     11/20/2024    CO2 25.0 11/20/2024     Lab Results   Component Value Date     11/20/2024    A1C 5.2 11/20/2024     Lab Results   Component Value Date    CHOLEST 120 11/20/2024    TRIG 87 11/20/2024    HDL 41 11/20/2024    LDL 62 11/20/2024    VLDL 13 11/20/2024    TCHDLRATIO 2.89 06/26/2018    NONHDLC 79 11/20/2024     Lab Results   Component Value Date    T4F 1.2 10/14/2021    TSH 2.397 11/20/2024     Lab Results   Component Value Date    B12 609 11/20/2024     Lab Results   Component Value Date    VITD 25.2 (L) 11/20/2024       Medications Ordered Prior to Encounter[1]    ASSESSMENT  Initial Weight Data and Goal Weight Loss:  Weight Calculations  Initial Weight: 287.5 lbs  Initial Weight Date: 11/20/24  Today's Weight: 287.5 lbs  5% Goal: 14.38  10% Goal: 28.75  Total Weight Loss: 0 lbs    Diagnoses and all orders for this visit:    Therapeutic drug monitoring  -     B12 AND FOLATE; Future  -     Vitamin D; Future  -     Hemoglobin A1C; Future  -     [facility] Nutrition Therapy DIETITIAN - [place] [facility]    Obesity, Class II, BMI 35-39.9  -     B12 AND FOLATE; Future  -     Vitamin D; Future  -     Hemoglobin A1C; Future  -     [facility] Nutrition Therapy DIETITIAN - [place] [facility]    Gastroesophageal reflux disease with esophagitis without hemorrhage  -     B12 AND FOLATE; Future  -     Vitamin D; Future  -     Hemoglobin A1C; Future  -     [facility] Nutrition Therapy DIETITIAN - [place] [facility]    Chronic bilateral low back pain without sciatica  -     [facility] Nutrition Therapy DIETITIAN - [place] [facility]    Other  orders  -     semaglutide-weight management 0.25 MG/0.5ML Subcutaneous Solution Auto-injector; Inject 0.5 mL (0.25 mg total) into the skin once a week for 4 doses.  -     semaglutide-weight management 0.25 MG/0.5ML Subcutaneous Solution Auto-injector; Inject 0.5 mL (0.25 mg total) into the skin once a week for 4 doses.        PLAN  287 lb 8 oz (130.4 kg)  Body mass index is 37.42 kg/m².  Medication use and side effects reviewed with patient.  Medication contraindications: n/a   Trial of wegovy   -advised of side effects and adverse effects of this medication  Reviewed concerns with coverage   If not coverage reviewed alternative treatment options  Referral to dietitian   Check labs for 2/2 causes of weight gain /insulin resistance   Reviewed the role weight loss plays with PPI therapy   Follow up with dietitian and psychologist as recommended.  Discussed the role of sleep and stress in weight management.  Labs orders as above.  Counseled on comprehensive weight loss plan including attention to nutrition, exercise and behavior/stress management for success. See patient instruction below for more details.  Weight Loss Consent to treat reviewed and signed.    There are no Patient Instructions on file for this visit.    Return in about 8 weeks (around 1/15/2025).    Patient verbalizes understanding.    Freda Black MD           [1]   Current Outpatient Medications on File Prior to Visit   Medication Sig Dispense Refill    omeprazole 20 MG Oral Capsule Delayed Release Take 1 capsule (20 mg total) by mouth 2 (two) times daily before meals. 180 capsule 3    Budesonide-Formoterol Fumarate (SYMBICORT) 160-4.5 MCG/ACT Inhalation Aerosol Inhale 2 puffs into the lungs 2 (two) times daily. 30.6 g 3    albuterol 108 (90 Base) MCG/ACT Inhalation Aero Soln Inhale 2 puffs into the lungs every 4 (four) hours as needed for Wheezing. 18 g 0    diazepam 5 MG Oral Tab Take 1 tablet (5 mg total) by mouth every 12 (twelve) hours as needed  for Anxiety.      cephalexin (KEFLEX) 500 MG Oral Cap Take 1 capsule (500 mg total) by mouth 2 (two) times daily. 14 capsule 0     No current facility-administered medications on file prior to visit.

## 2024-11-25 ENCOUNTER — TELEMEDICINE (OUTPATIENT)
Dept: INTERNAL MEDICINE CLINIC | Facility: CLINIC | Age: 29
End: 2024-11-25
Payer: MEDICAID

## 2024-11-25 DIAGNOSIS — K76.0 HEPATIC STEATOSIS: Primary | ICD-10-CM

## 2024-11-25 DIAGNOSIS — R79.89 ELEVATED LFTS: ICD-10-CM

## 2024-11-25 DIAGNOSIS — E66.9 OBESITY (BMI 30-39.9): ICD-10-CM

## 2024-11-25 NOTE — PROGRESS NOTES
The patient's office visit was converted to a video visit with informed patient consent.   Time Spent: 12 min    Subjective     HPI:   Simba Garza is a 29 year old male who presents for follow-up. He did see Dr. Black and has not started Wegovy yet. Waiting till after the holidays to start. His LFT's elevated from prior. Has been trying to reduce his his processed food intake but has not been able to find consistent exercise. His weight is up about 10 lbs since our visit in May based on his recorded weight at the weight loss clinic this month.     REVIEW OF SYSTEMS:  GENERAL: no recent illness, feels well otherwise.     Physical Exam:  Appears well on exam.     Assessment and Plan:  Simba Garza is presenting for follow-up    Hepatic steatosis  Elevated LFTs  Obesity (BMI 30-39.9)  LFT's elevated from prior likely secondary to his known steatosis. Had previous GI work-up with serologic evaluation and US. Emphasis on weight loss to improve LFT's. Has had evaluation with our weight loss clinic and will be starting Wegovy. Repeat LFT's in 3 months.   - Hepatic Function Panel (7) [E]; Future      Asaf An MD    Simba Garza understands phone evaluation is not a substitute for face-to-face examination or emergency care. Patient advised to go to ER or call 911 for worsening symptoms or acute distress.     Please note that the following visit was completed using two-way, real-time interactive video communication. There are limitations of this visit as no physical exam could be performed.  Every conscious effort was taken to allow for sufficient and adequate time.  This billing visit was spent on reviewing labs, medications, radiology tests and decision making.  Appropriate medical decision-making and tests are ordered as detailed in the plan of care above.

## 2024-11-25 NOTE — TELEPHONE ENCOUNTER
Denied PA for wegovy from [facility]  Weight loss drugs are a plan exclusion

## 2025-02-04 ENCOUNTER — OFFICE VISIT (OUTPATIENT)
Dept: INTERNAL MEDICINE CLINIC | Facility: CLINIC | Age: 30
End: 2025-02-04
Payer: MEDICAID

## 2025-02-04 VITALS
HEIGHT: 73.5 IN | SYSTOLIC BLOOD PRESSURE: 120 MMHG | DIASTOLIC BLOOD PRESSURE: 82 MMHG | WEIGHT: 279 LBS | HEART RATE: 88 BPM | BODY MASS INDEX: 36.19 KG/M2 | RESPIRATION RATE: 22 BRPM

## 2025-02-04 DIAGNOSIS — K21.00 GASTROESOPHAGEAL REFLUX DISEASE WITH ESOPHAGITIS WITHOUT HEMORRHAGE: ICD-10-CM

## 2025-02-04 DIAGNOSIS — M54.50 CHRONIC BILATERAL LOW BACK PAIN WITHOUT SCIATICA: ICD-10-CM

## 2025-02-04 DIAGNOSIS — E66.812 OBESITY, CLASS II, BMI 35-39.9: ICD-10-CM

## 2025-02-04 DIAGNOSIS — G89.29 CHRONIC BILATERAL LOW BACK PAIN WITHOUT SCIATICA: ICD-10-CM

## 2025-02-04 DIAGNOSIS — Z51.81 THERAPEUTIC DRUG MONITORING: Primary | ICD-10-CM

## 2025-02-04 PROCEDURE — 99214 OFFICE O/P EST MOD 30 MIN: CPT | Performed by: INTERNAL MEDICINE

## 2025-02-04 NOTE — PROGRESS NOTES
HISTORY OF PRESENT ILLNESS  Chief Complaint   Patient presents with    Weight Check     Down 8        Simba Garza is a 30 year old male here for follow up in medical weight loss program.     Denies chest pain, shortness of breath, dizziness, blurred vision, headache, paresthesia, nausea/vomiting.     Started sample 3 weeks ago   Does chicken and red meat   Does protein shake every morning  Rice potato/ chicken   Trying to stay low carb  Has gottent back into exercise   Started calisthenics and weight lifting   Energy has been better   More well rested           Wt Readings from Last 6 Encounters:   02/04/25 279 lb (126.6 kg)   11/20/24 287 lb 8 oz (130.4 kg)   05/23/24 279 lb (126.6 kg)   03/17/23 283 lb (128.4 kg)   04/07/21 250 lb 3.2 oz (113.5 kg)   03/10/21 252 lb 9.6 oz (114.6 kg)          [facility] Follow Up    General Information  Success Moment: Feeling more energy thru the day, waking up feeling well   rested  Challenging Moment: Need to remind my self eat.  Nutrition Recall  Breakfast: Fairlife protien shake. Lunch: Cheese stick / beef jerkey /   veggies   Dinner: Chicken or beef with veggies no rice or noodles. Snacks: None   Fluids: Gallon of water a day Dining Out: 1   Exercise   Patient stated exercises # days/week: 5  Patient stated perceived level of   exertion: 2 Anaerobic Days: 5   Aerobic Days: 5   Patient stated average level of stress: 2  Sleep   Patient stated # hours uninterrupted sleep: 7   Patient stated feels   restful: Yes      Goals: Continue moving forward              Breakfast Lunch Dinner Snacks Fluids   Reviewed           REVIEW OF SYSTEMS  GENERAL HEALTH: feels well otherwise, denied any fevers chills or night sweats   RESPIRATORY: denies shortness of breath   CARDIOVASCULAR: denies chest pain  GI: denies abdominal pain    EXAM  /82   Pulse 88   Resp 22   Ht 6' 1.5\" (1.867 m)   Wt 279 lb (126.6 kg)   BMI 36.31 kg/m²   GENERAL: well developed, well nourished,in no apparent  distress, A/O x3  SKIN: no rashes,no suspicious lesions  HEENT: atraumatic, normocephalic, OP-clear, PERRL  NECK: supple,no adenopathy  LUNGS: clear to auscultation bilaterally   CARDIO: RRR without murmur  GI: good BS's,NT/ND, no masses or HSM  EXTREMITIES: no cyanosis, no clubbing, no edema    Lab Results   Component Value Date    WBC 8.6 11/20/2024    RBC 5.65 11/20/2024    HGB 15.7 11/20/2024    HCT 46.7 11/20/2024    MCV 82.7 11/20/2024    MCH 27.8 11/20/2024    MCHC 33.6 11/20/2024    RDW 12.7 11/20/2024    .0 11/20/2024    MPV 11.8 (H) 07/05/2012     Lab Results   Component Value Date    GLU 90 11/20/2024    BUN 14 11/20/2024    BUNCREA 10.5 12/30/2020    CREATSERUM 1.08 11/20/2024    ANIONGAP 7 11/20/2024    GFRNAA 89 10/14/2021    GFRAA 103 10/14/2021    CA 9.8 11/20/2024    OSMOCALC 282 11/20/2024    ALKPHO 55 11/20/2024    AST 73 (H) 11/20/2024     (H) 11/20/2024    BILT 0.8 11/20/2024    TP 7.5 11/20/2024    ALB 4.6 11/20/2024    GLOBULIN 2.9 11/20/2024     11/20/2024    K 4.4 11/20/2024     11/20/2024    CO2 25.0 11/20/2024     Lab Results   Component Value Date     11/20/2024    A1C 5.2 11/20/2024     Lab Results   Component Value Date    CHOLEST 120 11/20/2024    TRIG 87 11/20/2024    HDL 41 11/20/2024    LDL 62 11/20/2024    VLDL 13 11/20/2024    TCHDLRATIO 2.89 06/26/2018    NONHDLC 79 11/20/2024     Lab Results   Component Value Date    T4F 1.2 10/14/2021    TSH 2.397 11/20/2024     Lab Results   Component Value Date    B12 609 11/20/2024     Lab Results   Component Value Date    VITD 25.2 (L) 11/20/2024       Medications Ordered Prior to Encounter[1]    ASSESSMENT  Analyzed weight data:       Diagnoses and all orders for this visit:    Therapeutic drug monitoring    Obesity, Class II, BMI 35-39.9    Gastroesophageal reflux disease with esophagitis without hemorrhage    Chronic bilateral low back pain without sciatica    Other orders  -     CUSTOM MEDICATION;  Semaglutide/ cynaocobalamin injection 1/0.5 mg/ ML   2.5 ml vial   Take 50 units aka 0.5 ml q weekly subcutaneously  -     CUSTOM MEDICATION; Semaglutide/ cynaocobalamin injection 1/0.5 mg/ ML   2.5 ml vial   Take 50 units aka 0.5 ml q weekly        PLAN     11/20/24: 287 lb  Down 8 lb   Total time spent on chart review, pre-charting, obtaining history, counseling, and educating, reviewing labs was 30 minutes.  NO glp1 coverage  Increase dose from 0.25 to 0.5 mg weekly  Compound semaglutide is a custom-made medication that mimics the GLP-1 hormone. It is used to treat Obesity and lower the risk of heart or blood vessel disease. It works by increasing insulin release, lowering glucagon release, delaying gastric emptying and reducing appetite. Compound semaglutide is prescribed when an FDA-approved medication, dose, or dosage form is unavailable (ie. Nationwide shortage or no obesity coverage for GLP-1 meds). Patients are aware of the difference between these medications.   Cost of these medications is  dollars monthly based on dose.    Nutrition: low carb diet/ recommended to eat breakfast daily/ regular protein intake  Medication use and side effects reviewed with patient.  Medication contraindications: n/a  Follow up with dietitian and psychologist as recommended.  Discussed the role of sleep and stress in weight management.  Counseled on comprehensive weight loss plan including attention to nutrition, exercise and behavior/stress management for success. See patient instruction below for more details.  Discussed strategies to overcome barriers to successful weight loss and weight maintenance  FITTE: ACSM recommendations (150-300 minutes/ week in active weight loss)   Weight Loss consent to treat reviewed and signed    Patient Instructions   [facility]  [address]  [place] [address]    Phone  Phone: [phone]    Fax  Fax: [phone]    Hours  Pharmacy: Mon - Fri 7:30AM -  7:30PM    Call Center: Mon - Fri 7:00AM - 7:00PM      No follow-ups on file.    Patient verbalizes understanding.    Freda Black MD           [1]   Current Outpatient Medications on File Prior to Visit   Medication Sig Dispense Refill    ergocalciferol 1.25 MG (25752 UT) Oral Cap Take 1 capsule (50,000 Units total) by mouth once a week. With food for 12 weeks total then begin OTC Vitamin D at 2000 units daily with food thereafter 12 capsule 0    omeprazole 20 MG Oral Capsule Delayed Release Take 1 capsule (20 mg total) by mouth 2 (two) times daily before meals. 180 capsule 3    Budesonide-Formoterol Fumarate (SYMBICORT) 160-4.5 MCG/ACT Inhalation Aerosol Inhale 2 puffs into the lungs 2 (two) times daily. 30.6 g 3    albuterol 108 (90 Base) MCG/ACT Inhalation Aero Soln Inhale 2 puffs into the lungs every 4 (four) hours as needed for Wheezing. 18 g 0    diazepam 5 MG Oral Tab Take 1 tablet (5 mg total) by mouth every 12 (twelve) hours as needed for Anxiety.       No current facility-administered medications on file prior to visit.

## 2025-02-04 NOTE — PATIENT INSTRUCTIONS
[facility]  [address]  [place] [address]    Phone  Phone: [phone]    Fax  Fax: [phone]    Hours  Pharmacy: Mon - Fri 7:30AM - 7:30PM    Call Center: Mon - Fri 7:00AM - 7:00PM

## 2025-03-30 ENCOUNTER — PATIENT MESSAGE (OUTPATIENT)
Dept: INTERNAL MEDICINE CLINIC | Facility: CLINIC | Age: 30
End: 2025-03-30

## 2025-03-31 NOTE — TELEPHONE ENCOUNTER
Increase in compound and would like 3 month.   Requested Prescriptions     Pending Prescriptions Disp Refills    CUSTOM MEDICATION 1 each 1     Sig: Semaglutide/ cynaocobalamin injection 1/0.5 mg/ ML   2.5 ml vial   Take 50 units aka 0.5 ml q weekly subcutaneously

## 2025-04-17 DIAGNOSIS — J45.30 MILD PERSISTENT ASTHMA WITHOUT COMPLICATION (HCC): ICD-10-CM

## 2025-04-22 RX ORDER — BUDESONIDE AND FORMOTEROL FUMARATE DIHYDRATE 160; 4.5 UG/1; UG/1
2 AEROSOL RESPIRATORY (INHALATION) 2 TIMES DAILY
Qty: 30.6 G | Refills: 3 | Status: SHIPPED | OUTPATIENT
Start: 2025-04-22

## 2025-07-11 ENCOUNTER — HOSPITAL ENCOUNTER (EMERGENCY)
Facility: HOSPITAL | Age: 30
Discharge: HOME | End: 2025-07-11
Attending: EMERGENCY MEDICINE
Payer: MEDICAID

## 2025-07-11 ENCOUNTER — APPOINTMENT (OUTPATIENT)
Dept: GENERAL RADIOLOGY | Facility: HOSPITAL | Age: 30
End: 2025-07-11
Attending: EMERGENCY MEDICINE
Payer: MEDICAID

## 2025-07-11 ENCOUNTER — APPOINTMENT (OUTPATIENT)
Dept: CT IMAGING | Facility: HOSPITAL | Age: 30
End: 2025-07-11
Attending: EMERGENCY MEDICINE
Payer: MEDICAID

## 2025-07-11 ENCOUNTER — APPOINTMENT (OUTPATIENT)
Dept: CT IMAGING | Age: 30
End: 2025-07-11
Attending: EMERGENCY MEDICINE
Payer: MEDICAID

## 2025-07-11 VITALS
DIASTOLIC BLOOD PRESSURE: 62 MMHG | WEIGHT: 268 LBS | RESPIRATION RATE: 18 BRPM | SYSTOLIC BLOOD PRESSURE: 115 MMHG | HEIGHT: 73 IN | TEMPERATURE: 97 F | BODY MASS INDEX: 35.52 KG/M2 | HEART RATE: 80 BPM | OXYGEN SATURATION: 97 %

## 2025-07-11 DIAGNOSIS — S82.445A CLOSED NONDISPLACED SPIRAL FRACTURE OF SHAFT OF LEFT FIBULA, INITIAL ENCOUNTER: ICD-10-CM

## 2025-07-11 DIAGNOSIS — S82.892A CLOSED FRACTURE OF LEFT ANKLE, INITIAL ENCOUNTER: Primary | ICD-10-CM

## 2025-07-11 PROCEDURE — 99285 EMERGENCY DEPT VISIT HI MDM: CPT

## 2025-07-11 PROCEDURE — 96374 THER/PROPH/DIAG INJ IV PUSH: CPT

## 2025-07-11 PROCEDURE — 96361 HYDRATE IV INFUSION ADD-ON: CPT

## 2025-07-11 PROCEDURE — 73590 X-RAY EXAM OF LOWER LEG: CPT | Performed by: EMERGENCY MEDICINE

## 2025-07-11 PROCEDURE — 96375 TX/PRO/DX INJ NEW DRUG ADDON: CPT

## 2025-07-11 PROCEDURE — 73600 X-RAY EXAM OF ANKLE: CPT | Performed by: EMERGENCY MEDICINE

## 2025-07-11 PROCEDURE — 27752 TREATMENT OF TIBIA FRACTURE: CPT

## 2025-07-11 PROCEDURE — 99284 EMERGENCY DEPT VISIT MOD MDM: CPT

## 2025-07-11 PROCEDURE — 73700 CT LOWER EXTREMITY W/O DYE: CPT | Performed by: EMERGENCY MEDICINE

## 2025-07-11 PROCEDURE — 96376 TX/PRO/DX INJ SAME DRUG ADON: CPT

## 2025-07-11 RX ORDER — HYDROMORPHONE HYDROCHLORIDE 1 MG/ML
1 INJECTION, SOLUTION INTRAMUSCULAR; INTRAVENOUS; SUBCUTANEOUS ONCE
Refills: 0 | Status: COMPLETED | OUTPATIENT
Start: 2025-07-11 | End: 2025-07-11

## 2025-07-11 RX ORDER — ONDANSETRON 2 MG/ML
4 INJECTION INTRAMUSCULAR; INTRAVENOUS ONCE
Status: COMPLETED | OUTPATIENT
Start: 2025-07-11 | End: 2025-07-11

## 2025-07-11 RX ORDER — LORAZEPAM 2 MG/ML
1 INJECTION INTRAMUSCULAR ONCE
Status: COMPLETED | OUTPATIENT
Start: 2025-07-11 | End: 2025-07-11

## 2025-07-11 RX ORDER — HYDROCODONE BITARTRATE AND ACETAMINOPHEN 5; 325 MG/1; MG/1
1 TABLET ORAL EVERY 6 HOURS PRN
Qty: 10 TABLET | Refills: 0 | Status: SHIPPED | OUTPATIENT
Start: 2025-07-11 | End: 2025-07-16

## 2025-07-11 RX ORDER — HYDROMORPHONE HYDROCHLORIDE 1 MG/ML
0.5 INJECTION, SOLUTION INTRAMUSCULAR; INTRAVENOUS; SUBCUTANEOUS ONCE
Refills: 0 | Status: COMPLETED | OUTPATIENT
Start: 2025-07-11 | End: 2025-07-11

## 2025-07-11 RX ORDER — ONDANSETRON 4 MG/1
4 TABLET, ORALLY DISINTEGRATING ORAL EVERY 4 HOURS PRN
Qty: 10 TABLET | Refills: 0 | Status: SHIPPED | OUTPATIENT
Start: 2025-07-11 | End: 2025-07-18

## 2025-07-11 RX ORDER — IBUPROFEN 600 MG/1
600 TABLET, FILM COATED ORAL ONCE
Status: COMPLETED | OUTPATIENT
Start: 2025-07-11 | End: 2025-07-11

## 2025-07-11 RX ORDER — HYDROMORPHONE HYDROCHLORIDE 1 MG/ML
0.5 INJECTION, SOLUTION INTRAMUSCULAR; INTRAVENOUS; SUBCUTANEOUS ONCE
Refills: 0 | Status: DISCONTINUED | OUTPATIENT
Start: 2025-07-11 | End: 2025-07-11

## 2025-07-11 NOTE — ED INITIAL ASSESSMENT (HPI)
Problem: [facility]  Goal: Infant does not have pain / discomfort per NIPS Scale  Outcome: Outcome Met, Continue evaluating goal progress toward completion   18 0275   NIPS (/Infant Pain Scale)   NIPS Score 0          Pt presents to ED with chief complaint of ankle pain. Pt states that he accidentally stepped in to a sqoosh diane and accidentally twist his L ankle.

## 2025-07-11 NOTE — ED QUICK NOTES
Rounding Completed  Patient reports feeling better at this time, repeat x-ray completed    Bed is locked and in lowest position. Call light within reach, family remains at bedside .

## 2025-07-11 NOTE — DISCHARGE INSTRUCTIONS
Take Motrin but if continued symptoms take the Norco.  Follow-up with your primary MD follow-up with orthopedic surgery the call for further evaluation.  Call for an appointment    Elevate the leg is much as possible.    Norco, Tylenol No. 3 are medications that have side effects.  You should not take these medications when you're driving.  You should not take these medications with Tylenol, alcohol.  It is preferable that you start other medications first as these medications can be also addictive.  Can also cause constipation usually take a stool softener with this medications..

## 2025-07-11 NOTE — ED PROVIDER NOTES
Patient Seen in: [facility]        History  Chief Complaint   Patient presents with    Leg or Foot Injury     Stated Complaint: ankle injury    Subjective:   HPI          This is a 30-year-old male who accidentally slipped and twisted his ankle.  On a toy.  He states that it happened last night.  He twisted his left ankle.  He has pain to the left lateral ankle and medial ankle.  He barely could walk on it secondary to pain.  The patient denies any numbness or weakness.  The patient denies any other injuries.  Or pain or discomfort.  Patient denies any foot pain, knee pain numbness or weakness.      Objective:     Past Medical History:    Abdominal pain    Asthma (HCC)    Blood in the stool    Flatulence/gas pain/belching    History of 2019 novel coronavirus disease (COVID-19)    fatigue, SOB, Fever, body ache    Pain with bowel movements              History reviewed. No pertinent surgical history.             Social History     Socioeconomic History    Marital status: Single   Tobacco Use    Smoking status: Never    Smokeless tobacco: Never   Vaping Use    Vaping status: Never Used   Substance and Sexual Activity    Alcohol use: Not Currently     Alcohol/week: 15.0 standard drinks of alcohol     Types: 15 Standard drinks or equivalent per week    Drug use: No                                Physical Exam    ED Triage Vitals   BP 07/11/25 0603 134/90   Pulse 07/11/25 0603 96   Resp 07/11/25 0603 19   Temp 07/11/25 0822 97.4 °F (36.3 °C)   Temp src --    SpO2 07/11/25 0603 100 %   O2 Device 07/11/25 0603 None (Room air)       Current Vitals:   Vital Signs  BP: 114/74  Pulse: 75  Resp: 16  Temp: 97.4 °F (36.3 °C)  MAP (mmHg): (!) 103    Oxygen Therapy  SpO2: 96 %  O2 Device: None (Room air)            Physical Exam     General:  The patient is in no respiratory distress    HEENT: There is no signs of trauma.  Oral mucosa is wet.    Lungs: Clear to auscultation without wheezing or  retractions    Cardiovascular: Regular without murmurs    Extremities: Good pulses bilaterally.    On his left ankle he has tenderness both the medial and lateral aspect.  He has no knee tenderness.  Is fairly swollen.  There is no laceration there is no Achilles tendon tenderness no foot tenderness there is good pulses sensory exam is normal muscle strength sensory exam is normal there is no knee tenderness  Neuro: Alert and oriented.  The patient is moving all extremities there is no focal findings.      ED Course  Labs Reviewed - No data to display       X-rays were done to rule out fracture or dislocation    I personally reviewed the radiographs and my individual interpretation shows    Ankle fracture with subluxation noted.    Also reviewed official report and it shows     XR TIBIA + FIBULA (2 VIEWS), LEFT (CPT=73590)  Result Date: 7/11/2025  PROCEDURE: XR TIBIA + FIBULA (2 VIEWS), LEFT (CPT=73590) INDICATIONS: ankle injury PATIENT STATED HISTORY: Patient slipped on a stuffed animal and fell. COMPARISON: No comparisons. FINDINGS: There is a mildly displaced oblique comminuted fracture seen at the mid left fibula. There is a mildly displaced comminuted fracture again noted in the region of the posterior malleolus. There is apparent anterior subluxation/dislocation of the  tibia relative to the talus on the lateral view. Clinical correlation with physical exam is recommended. There is a mildly displaced fracture at the medial malleolus on the AP view.     CONCLUSION: See above. Electronically Verified and Signed by Attending Radiologist: LeRoy Stromberg MD 7/11/2025 8:08 AM Workstation: [id number]    XR ANKLE (2 VIEW), LEFT (CPT=73600)  Result Date: 7/11/2025  PROCEDURE: XR ANKLE (2 VIEW), LEFT (CPT=73600) INDICATIONS: ankle injury pain COMPARISON: There are no comparisons for this exam. TECHNIQUE: AP and lateral radiographs of the left ankle FINDINGS: Subluxation of the tibiotalar articulation appears overall  improved. A true ankle mortise view was not performed which limits assessment. There remains a mildly displaced fracture of the posterior malleolus with a nondisplaced fracture of the medial aspect of the tibia. Continued follow-up is recommended. OTHER:Negative.     CONCLUSION: See above. Electronically Verified and Signed by Attending Radiologist: Vladimir Hicks MD 7/11/2025 8:07 AM Workstation: [id number]    XR ANKLE (2 VIEW), LEFT (CPT=73600)  Result Date: 7/11/2025  PROCEDURE: XR ANKLE (2 VIEW), LEFT (CPT=73600) INDICATIONS: ankle injury PATIENT STATED HISTORY: Patient states left ankle pain after trip and fall. COMPARISON: No comparisons. FINDINGS: Suboptimal examination secondary to limited patient positioning. There is a mildly displaced likely comminuted fracture at the posterior malleolus. There is left ankle soft tissue swelling. There is an oblique lucency, partially visualized at the proximal left fibula. Dedicated radiographs of the left tibia and fibula are recommended for further assessment     CONCLUSION: See above. Electronically Verified and Signed by Attending Radiologist: LeRoy Stromberg MD 7/11/2025 6:38 AM Workstation: [id number]         [facility]      The patient was given Dilaudid, Ativan given a liter of fluid.  The patient did seem to have a subluxation I discussed with him may be reasonable to try to get into a better position.  And with sedation with using some Ativan, Dilaudid the patient had the leg reduced in a better position.  The reduction was significantly better.  The patient felt much better.  And the patient was placed in a reverse sugar-tong and also placed in a posterior mold.  The patient was neurovascular intact afterwards.  I discussed this case with the patient and the family.     Patient's case was discussed with Dr. River from orthopedic surgery.  Subsequently he Dr. Purdy did want a CT of the left ankle for possible preop and evaluation.  To see how much they would have to do for  surgery.  The CT was ordered.  And subsequently will be discharged.  I did go back and reexamined him he is alert and oriented.  His muscular exam is normal.  He is he is got good capillary refill there is no laceration is otherwise neurovascular intact.  CT at the time of dictation was pending.  He will be given crutches.  Nonweightbearing is much as possible.  Given a short course of pain meds.        Medical Decision Making      Disposition and Plan     Clinical Impression:  1. Closed fracture of left ankle, initial encounter    2. Closed nondisplaced spiral fracture of shaft of left fibula, initial encounter         Disposition:  There is no disposition on file for this visit.  There is no disposition time on file for this visit.    Follow-up:  Asaf An MD  [address]  [address] [address]  [phone]    Follow up in 2 day(s)      Liliana Vasquez MD  [address] [place] [address]  [phone]    Follow up in 2 day(s)            Medications Prescribed:  Current Discharge Medication List        START taking these medications    Details   HYDROcodone-acetaminophen 5-325 MG Oral Tab Take 1 tablet by mouth every 6 (six) hours as needed.  Qty: 10 tablet, Refills: 0    Associated Diagnoses: Closed fracture of left ankle, initial encounter; Closed nondisplaced spiral fracture of shaft of left fibula, initial encounter      ondansetron 4 MG Oral Tablet Dispersible Take 1 tablet (4 mg total) by mouth every 4 (four) hours as needed for Nausea.  Qty: 10 tablet, Refills: 0    Associated Diagnoses: Closed fracture of left ankle, initial encounter; Closed nondisplaced spiral fracture of shaft of left fibula, initial encounter      Naloxone HCl 4 MG/0.1ML Nasal Liquid 4 mg by Intranasal route as needed (May repeat as needed in other nostril if symptoms persist). If patient remains unresponsive, repeat dose in other nostril 2-5 minutes after first dose.  Qty: 1 kit, Refills: 0                    Supplementary Documentation:                            ED Sedation Documentation  Reason for sedation: reduction .  The risks, benefits, and alternatives were discussed with the patient and/or the family who consented.  The patient had a screening history and exam completed and there are no contraindications to sedation.  NPO for how many hours before sedation?: 3.  ASA Score per MD: 1 - Normal, healthy patient.    Mallampati Class per MD: Class II - Tonsillar pillars and base of uvula hidden by base of tongue.    The patient was placed on monitors and was under constant nursing observation.  Sedative medication given: ativan dilaudid An appropriate level of sedation was achieved.  The patient remained hemodynamically stable with normal oxygen saturations during the procedure.  There were no complications related to the sedation.  Patient was observed until mental status returned to baseline.  Patient was subsequently deemed appropriate for discharge home with responsible caretaker.  I was present for the following minutes during sedation: 15             The patient's ankle was subluxed was reduced without complication patient was neurovascular tact afterwards.

## 2025-07-14 ENCOUNTER — OFFICE VISIT (OUTPATIENT)
Dept: ORTHOPEDICS CLINIC | Facility: CLINIC | Age: 30
End: 2025-07-14
Payer: MEDICAID

## 2025-07-14 VITALS — WEIGHT: 265 LBS | HEIGHT: 73.2 IN | BODY MASS INDEX: 34.74 KG/M2

## 2025-07-14 DIAGNOSIS — S93.432A SYNDESMOTIC DISRUPTION OF LEFT ANKLE, INITIAL ENCOUNTER: ICD-10-CM

## 2025-07-14 DIAGNOSIS — S82.873A CLOSED FRACTURE OF TIBIAL PLAFOND: Primary | ICD-10-CM

## 2025-07-14 DIAGNOSIS — S82.452A CLOSED DISPLACED COMMINUTED FRACTURE OF SHAFT OF LEFT FIBULA, INITIAL ENCOUNTER: ICD-10-CM

## 2025-07-14 PROCEDURE — 99205 OFFICE O/P NEW HI 60 MIN: CPT | Performed by: ORTHOPAEDIC SURGERY

## 2025-07-14 RX ORDER — ASPIRIN 81 MG/1
81 TABLET, COATED ORAL DAILY
Qty: 30 TABLET | Refills: 0 | Status: SHIPPED | OUTPATIENT
Start: 2025-07-14

## 2025-07-14 RX ORDER — ONDANSETRON 4 MG/1
TABLET, FILM COATED ORAL
Qty: 8 TABLET | Refills: 0 | Status: SHIPPED | OUTPATIENT
Start: 2025-07-14

## 2025-07-14 RX ORDER — TRAMADOL HYDROCHLORIDE 50 MG/1
TABLET ORAL
Qty: 20 TABLET | Refills: 1 | Status: SHIPPED | OUTPATIENT
Start: 2025-07-14

## 2025-07-14 NOTE — H&P
Orthopedic Surgery  [address] [place] [id number]  [phone]     NEW PATIENT VISIT - HISTORY AND PHYSICAL EXAMINATION     Name: Simba Garza   MRN: [id number]  Date: 7/14/2025     CC: Left ankle Pain / ankle fracture    REFERRED BY: Asaf An MD    HPI: Simba Garza is a 30 year old male who presents with a left ankle injury. He is accompanied by his wife and child.    He sustained a left ankle injury on July 11, 2025, after slipping on a toy at home. The pain is rated as 5 out of 10. He was initially evaluated in the emergency department where a closed reduction was performed, and he has been taking ibuprofen for pain relief.    He has a history of a previous left ankle fracture during adolescence, which was treated with a cast and later a boot. He did not adhere well to care instructions at that time, resulting in limited upward mobility of the ankle since then.    He has been avoiding hydrocodone, only taking it once for severe pain to aid sleep, and primarily uses ibuprofen for pain management. He reports that his shin does not have severe pain, describing it as more of an annoying pain rather than miserable.    He is concerned about attending his sister's wedding in [place] in two and a half weeks and inquires about using a mobility scooter instead of crutches due to safety concerns at home with a two-year-old child.    His current medications include diazepam once or twice a day, Symbicort for asthma, and omeprazole for GERD.    PMH:   Past Medical History[1]    PAST SURGICAL HX:  Past Surgical History[2]    FAMILY HX:  Family History[3]    ALLERGIES:  Patient has no known allergies.    MEDICATIONS: Current Medications[4]    ROS: A comprehensive 14 point review of systems was performed and was negative aside from the aforementioned per history of present illness.    SOCIAL HX:  Social History     Tobacco Use    Smoking status: Never    Smokeless tobacco: Never   Substance Use Topics     Alcohol use: Not Currently     Alcohol/week: 15.0 standard drinks of alcohol     Types: 15 Standard drinks or equivalent per week         PE:   Vitals:    07/14/25 1241   Weight: 265 lb (120.2 kg)   Height: 6' 1.2\" (1.859 m)     Estimated body mass index is 34.77 kg/m² as calculated from the following:    Height as of this encounter: 6' 1.2\" (1.859 m).    Weight as of this encounter: 265 lb (120.2 kg).    Physical Exam  Constitutional:       Appearance: Normal appearance.   HENT:      Head: Normocephalic and atraumatic.   Eyes:      Extraocular Movements: Extraocular movements intact.   Neck:      Musculoskeletal: Normal range of motion and neck supple.   Cardiovascular:      Pulses: Normal pulses.   Pulmonary:      Effort: Pulmonary effort is normal. No respiratory distress.   Abdominal:      General: There is no distension.   Skin:     General: Skin is warm.      Capillary Refill: Capillary refill takes less than 2 seconds.      Findings: No bruising.   Neurological:      General: No focal deficit present.      Mental Status: Alert.   Psychiatric:         Mood and Affect: Mood normal.     Examination of the left ankle demonstrates:     Skin is intact, warm and dry. Splinted    Inspection:   Atrophy: none    Effusion: small    Edema: moderate    Erythema: none    Hematoma: mild      Palpation:   Deferred due to acute injury     No obvious peripheral edema noted.   Distal neurovascular exam demonstrates normal perfusion, intact sensation to light touch and full strength.     Examination of the contralateral foot/ankle demonstrates:  No significant atrophy, swelling or effusion. Full range of motion. Neurovascularly intact distally.    Radiographic Examination/Diagnostics:  XR / CT personally viewed, independently interpreted and radiology report was reviewed.    CT ANKLE LEFT (CPT=73700)  Result Date: 7/11/2025  CONCLUSION: 1.  There is a disruption of the distal tibiofibular syndesmosis extending through the  anterior tibiofibular ligament with an associated disruption of the interosseous membrane and associated comminuted fracture of the proximal to mid fibular shaft. Of note, the very cephalad portion of the fracture plane extends towards the fibular head and is not within the field-of-view of this exam. 2.  There is an associated comminuted intra-articular fracture of the posterior malleolus that extends into the medial malleolus. There is a posterior lateral fracture fragment of the posterior malleolus that is posteriorly displaced by approximately 9 mm. Electronically Verified and Signed by Attending Radiologist: Radha Vasquez MD 7/11/2025 9:20 AM Workstation: [id number]    XR TIBIA + FIBULA (2 VIEWS), LEFT (CPT=73590)  Result Date: 7/11/2025  CONCLUSION: See above. Electronically Verified and Signed by Attending Radiologist: LeRoy Stromberg MD 7/11/2025 8:08 AM Workstation: [id number]    XR ANKLE (2 VIEW), LEFT (CPT=73600)  Result Date: 7/11/2025  CONCLUSION: See above. Electronically Verified and Signed by Attending Radiologist: Vladimir Hicks MD 7/11/2025 8:07 AM Workstation: [id number]    XR ANKLE (2 VIEW), LEFT (CPT=73600)  Result Date: 7/11/2025  CONCLUSION: See above. Electronically Verified and Signed by Attending Radiologist: LeRoy Stromberg MD 7/11/2025 6:38 AM Workstation: [id number]      IMPRESSION: Simba Garza is a 30 year old male is closed displaced fracture tibial plafond injury and ankle syndesmosis injury. High fibular shaft injury. High energy injury and functional limitation.     This merits surgical intervention with ORIF to optimize function and clinical recovery.     PLAN:   We had a detailed discussion outlining the etiology, anatomy, pathophysiology, and natural history of ankle injuries. Imaging was reviewed in detail.    I had a lengthy discussion with Simba about the diagnosis and options, both surgical and nonsurgical. I have recommended that we proceed with surgical treatment as we  agree surgical intervention would likely offer the best opportunity for symptomatic relief and functional recovery. I used diagrams, radiographic studies, and a 3-dimensional model to outline his pathology, as well as general surgical principles. We reviewed the risks associated with the proposed procedure.  In particular we discussed risks that include, but are not limited to infection, blood loss, potential transient or permanent injury to nerves or blood vessels, joint stiffness, persistent pain, need for future operation, failure of healing, wound complications, deep vein thrombosis and pulmonary embolism. We discussed the proposed rehabilitation timeline as well as expected postoperative restrictions. Sibma voiced a good understanding of treatment options, risks and benefits, postoperative instructions, rehabilitation timeline, and restrictions. He was given the opportunity to ask questions, which were all answered to the best of my ability and to his satisfaction. Simba will work with my office to arrange a time for surgery and obtain any medical clearance information necessary. My pre-operative information packet, which details the process and answers many FAQ's will be provided. He was encouraged to call the office with any further questions or concerns.    Left ankle fracture with syndesmosis injury  Fibular shaft fracture with interosseous membrane disruption and intra-articular bone fragments. Surgical intervention required to stabilize joint and prevent complications.     Risks include infection, nerve damage, and hardware necessity. Benefits include stabilization and improved function. Surgery planned.  - Schedule surgery immediately, ideally tomorrow.  - Administer antibiotics on surgery day to reduce infection risk.  - Provide splint for one week post-surgery, then transition to removable boot.  - Instruct on non-weight bearing for 4-6 weeks post-surgery.  - Arrange mobility scooter and handicap  parking pass.  - Discuss post-operative care and medication management, including holding certain medications before surgery.  - Provide recovery timeline guidance, potential return to work in 6 months.    Asthma  On Symbicort.  - Continue Symbicort.    Gastroesophageal reflux disease (GERD)  On omeprazole.  - Continue omeprazole.     I spent 60 minutes in preparation to see the patient, counseling/education of relevant pathology, discussing imaging results, ordering therapy  intervention, care coordination, and documentation into the electronic medical record.    FOLLOW-UP:  Post-Operative Visit, POD 6-8        Liliana Vasquez MD  Knee, Shoulder, & Elbow Surgery / Sports Medicine Specialist  Orthopaedic Surgery  [address] [place] [id number]   [email or web address]  t: [phone]  o: [phone]  f: [phone]    This note was dictated using Dragon software.  While it was briefly proofread prior to completion, some grammatical, spelling, and word choice errors due to dictation may still occur.  The patient verbally consented to be recorded using ambient AI listening technology and understand that they can each withdraw their consent to this listening technology at any point by asking the clinician to turn off or pause the recording:         [1]   Past Medical History:   Abdominal pain    Asthma (HCC)    Blood in the stool    Flatulence/gas pain/belching    History of 2019 novel coronavirus disease (COVID-19)    fatigue, SOB, Fever, body ache    Pain with bowel movements   [2] History reviewed. No pertinent surgical history.  [3] History reviewed. No pertinent family history.  [4]   Current Outpatient Medications   Medication Sig Dispense Refill    HYDROcodone-acetaminophen 5-325 MG Oral Tab Take 1 tablet by mouth every 6 (six) hours as needed. 10 tablet 0    ondansetron 4 MG Oral Tablet Dispersible Take 1 tablet (4 mg total) by mouth every 4 (four) hours as needed for Nausea.  10 tablet 0    Naloxone HCl 4 MG/0.1ML Nasal Liquid 4 mg by Intranasal route as needed (May repeat as needed in other nostril if symptoms persist). If patient remains unresponsive, repeat dose in other nostril 2-5 minutes after first dose. 1 kit 0    Budesonide-Formoterol Fumarate (SYMBICORT) 160-4.5 MCG/ACT Inhalation Aerosol Inhale 2 puffs into the lungs 2 (two) times daily. 30.6 g 3    CUSTOM MEDICATION Semaglutide/ cynaocobalamin injection 5/0.5 mg/ ML   1 ml vial  Inject 0.2 mL / 20 units per week 3 each 0    CUSTOM MEDICATION Semaglutide/ cynaocobalamin injection 1/0.5 mg/ ML   2.5 ml vial   Take 50 units aka 0.5 ml q weekly subcutaneously (Patient not taking: Reported on 7/11/2025) 3 each 0    CUSTOM MEDICATION Semaglutide/ cynaocobalamin injection 1/0.5 mg/ ML   2.5 ml vial   Take 50 units aka 0.5 ml q weekly (Patient not taking: Reported on 7/11/2025) 1 each 0    ergocalciferol 1.25 MG (93837 UT) Oral Cap Take 1 capsule (50,000 Units total) by mouth once a week. With food for 12 weeks total then begin OTC Vitamin D at 2000 units daily with food thereafter 12 capsule 0    omeprazole 20 MG Oral Capsule Delayed Release Take 1 capsule (20 mg total) by mouth 2 (two) times daily before meals. 180 capsule 3    albuterol 108 (90 Base) MCG/ACT Inhalation Aero Soln Inhale 2 puffs into the lungs every 4 (four) hours as needed for Wheezing. 18 g 0    diazepam 5 MG Oral Tab Take 1 tablet (5 mg total) by mouth every 12 (twelve) hours as needed for Anxiety.

## 2025-07-14 NOTE — H&P (VIEW-ONLY)
Orthopedic Surgery  [address] [place] [id number]  [phone]     NEW PATIENT VISIT - HISTORY AND PHYSICAL EXAMINATION     Name: Simba Garza   MRN: [id number]  Date: 7/14/2025     CC: Left ankle Pain / ankle fracture    REFERRED BY: Asaf An MD    HPI: Simba Garza is a 30 year old male who presents with a left ankle injury. He is accompanied by his wife and child.    He sustained a left ankle injury on July 11, 2025, after slipping on a toy at home. The pain is rated as 5 out of 10. He was initially evaluated in the emergency department where a closed reduction was performed, and he has been taking ibuprofen for pain relief.    He has a history of a previous left ankle fracture during adolescence, which was treated with a cast and later a boot. He did not adhere well to care instructions at that time, resulting in limited upward mobility of the ankle since then.    He has been avoiding hydrocodone, only taking it once for severe pain to aid sleep, and primarily uses ibuprofen for pain management. He reports that his shin does not have severe pain, describing it as more of an annoying pain rather than miserable.    He is concerned about attending his sister's wedding in [place] in two and a half weeks and inquires about using a mobility scooter instead of crutches due to safety concerns at home with a two-year-old child.    His current medications include diazepam once or twice a day, Symbicort for asthma, and omeprazole for GERD.    PMH:   Past Medical History[1]    PAST SURGICAL HX:  Past Surgical History[2]    FAMILY HX:  Family History[3]    ALLERGIES:  Patient has no known allergies.    MEDICATIONS: Current Medications[4]    ROS: A comprehensive 14 point review of systems was performed and was negative aside from the aforementioned per history of present illness.    SOCIAL HX:  Social History     Tobacco Use    Smoking status: Never    Smokeless tobacco: Never   Substance Use Topics     Alcohol use: Not Currently     Alcohol/week: 15.0 standard drinks of alcohol     Types: 15 Standard drinks or equivalent per week         PE:   Vitals:    07/14/25 1241   Weight: 265 lb (120.2 kg)   Height: 6' 1.2\" (1.859 m)     Estimated body mass index is 34.77 kg/m² as calculated from the following:    Height as of this encounter: 6' 1.2\" (1.859 m).    Weight as of this encounter: 265 lb (120.2 kg).    Physical Exam  Constitutional:       Appearance: Normal appearance.   HENT:      Head: Normocephalic and atraumatic.   Eyes:      Extraocular Movements: Extraocular movements intact.   Neck:      Musculoskeletal: Normal range of motion and neck supple.   Cardiovascular:      Pulses: Normal pulses.   Pulmonary:      Effort: Pulmonary effort is normal. No respiratory distress.   Abdominal:      General: There is no distension.   Skin:     General: Skin is warm.      Capillary Refill: Capillary refill takes less than 2 seconds.      Findings: No bruising.   Neurological:      General: No focal deficit present.      Mental Status: Alert.   Psychiatric:         Mood and Affect: Mood normal.     Examination of the left ankle demonstrates:     Skin is intact, warm and dry. Splinted    Inspection:   Atrophy: none    Effusion: small    Edema: moderate    Erythema: none    Hematoma: mild      Palpation:   Deferred due to acute injury     No obvious peripheral edema noted.   Distal neurovascular exam demonstrates normal perfusion, intact sensation to light touch and full strength.     Examination of the contralateral foot/ankle demonstrates:  No significant atrophy, swelling or effusion. Full range of motion. Neurovascularly intact distally.    Radiographic Examination/Diagnostics:  XR / CT personally viewed, independently interpreted and radiology report was reviewed.    CT ANKLE LEFT (CPT=73700)  Result Date: 7/11/2025  CONCLUSION: 1.  There is a disruption of the distal tibiofibular syndesmosis extending through the  anterior tibiofibular ligament with an associated disruption of the interosseous membrane and associated comminuted fracture of the proximal to mid fibular shaft. Of note, the very cephalad portion of the fracture plane extends towards the fibular head and is not within the field-of-view of this exam. 2.  There is an associated comminuted intra-articular fracture of the posterior malleolus that extends into the medial malleolus. There is a posterior lateral fracture fragment of the posterior malleolus that is posteriorly displaced by approximately 9 mm. Electronically Verified and Signed by Attending Radiologist: Radha Vasquez MD 7/11/2025 9:20 AM Workstation: [id number]    XR TIBIA + FIBULA (2 VIEWS), LEFT (CPT=73590)  Result Date: 7/11/2025  CONCLUSION: See above. Electronically Verified and Signed by Attending Radiologist: LeRoy Stromberg MD 7/11/2025 8:08 AM Workstation: [id number]    XR ANKLE (2 VIEW), LEFT (CPT=73600)  Result Date: 7/11/2025  CONCLUSION: See above. Electronically Verified and Signed by Attending Radiologist: Vladimir Hicks MD 7/11/2025 8:07 AM Workstation: [id number]    XR ANKLE (2 VIEW), LEFT (CPT=73600)  Result Date: 7/11/2025  CONCLUSION: See above. Electronically Verified and Signed by Attending Radiologist: LeRoy Stromberg MD 7/11/2025 6:38 AM Workstation: [id number]      IMPRESSION: Simba Garza is a 30 year old male is closed displaced fracture tibial plafond injury and ankle syndesmosis injury. High fibular shaft injury. High energy injury and functional limitation.     This merits surgical intervention with ORIF to optimize function and clinical recovery.     PLAN:   We had a detailed discussion outlining the etiology, anatomy, pathophysiology, and natural history of ankle injuries. Imaging was reviewed in detail.    I had a lengthy discussion with Simba about the diagnosis and options, both surgical and nonsurgical. I have recommended that we proceed with surgical treatment as we  agree surgical intervention would likely offer the best opportunity for symptomatic relief and functional recovery. I used diagrams, radiographic studies, and a 3-dimensional model to outline his pathology, as well as general surgical principles. We reviewed the risks associated with the proposed procedure.  In particular we discussed risks that include, but are not limited to infection, blood loss, potential transient or permanent injury to nerves or blood vessels, joint stiffness, persistent pain, need for future operation, failure of healing, wound complications, deep vein thrombosis and pulmonary embolism. We discussed the proposed rehabilitation timeline as well as expected postoperative restrictions. Simba voiced a good understanding of treatment options, risks and benefits, postoperative instructions, rehabilitation timeline, and restrictions. He was given the opportunity to ask questions, which were all answered to the best of my ability and to his satisfaction. Simba will work with my office to arrange a time for surgery and obtain any medical clearance information necessary. My pre-operative information packet, which details the process and answers many FAQ's will be provided. He was encouraged to call the office with any further questions or concerns.    Left ankle fracture with syndesmosis injury  Fibular shaft fracture with interosseous membrane disruption and intra-articular bone fragments. Surgical intervention required to stabilize joint and prevent complications.     Risks include infection, nerve damage, and hardware necessity. Benefits include stabilization and improved function. Surgery planned.  - Schedule surgery immediately, ideally tomorrow.  - Administer antibiotics on surgery day to reduce infection risk.  - Provide splint for one week post-surgery, then transition to removable boot.  - Instruct on non-weight bearing for 4-6 weeks post-surgery.  - Arrange mobility scooter and handicap  parking pass.  - Discuss post-operative care and medication management, including holding certain medications before surgery.  - Provide recovery timeline guidance, potential return to work in 6 months.    Asthma  On Symbicort.  - Continue Symbicort.    Gastroesophageal reflux disease (GERD)  On omeprazole.  - Continue omeprazole.     I spent 60 minutes in preparation to see the patient, counseling/education of relevant pathology, discussing imaging results, ordering therapy  intervention, care coordination, and documentation into the electronic medical record.    FOLLOW-UP:  Post-Operative Visit, POD 6-8        Liliana Vasquez MD  Knee, Shoulder, & Elbow Surgery / Sports Medicine Specialist  Orthopaedic Surgery  [address] [place] [id number]   [email or web address]  t: [phone]  o: [phone]  f: [phone]    This note was dictated using Dragon software.  While it was briefly proofread prior to completion, some grammatical, spelling, and word choice errors due to dictation may still occur.  The patient verbally consented to be recorded using ambient AI listening technology and understand that they can each withdraw their consent to this listening technology at any point by asking the clinician to turn off or pause the recording:         [1]   Past Medical History:   Abdominal pain    Asthma (HCC)    Blood in the stool    Flatulence/gas pain/belching    History of 2019 novel coronavirus disease (COVID-19)    fatigue, SOB, Fever, body ache    Pain with bowel movements   [2] History reviewed. No pertinent surgical history.  [3] History reviewed. No pertinent family history.  [4]   Current Outpatient Medications   Medication Sig Dispense Refill    HYDROcodone-acetaminophen 5-325 MG Oral Tab Take 1 tablet by mouth every 6 (six) hours as needed. 10 tablet 0    ondansetron 4 MG Oral Tablet Dispersible Take 1 tablet (4 mg total) by mouth every 4 (four) hours as needed for Nausea.  10 tablet 0    Naloxone HCl 4 MG/0.1ML Nasal Liquid 4 mg by Intranasal route as needed (May repeat as needed in other nostril if symptoms persist). If patient remains unresponsive, repeat dose in other nostril 2-5 minutes after first dose. 1 kit 0    Budesonide-Formoterol Fumarate (SYMBICORT) 160-4.5 MCG/ACT Inhalation Aerosol Inhale 2 puffs into the lungs 2 (two) times daily. 30.6 g 3    CUSTOM MEDICATION Semaglutide/ cynaocobalamin injection 5/0.5 mg/ ML   1 ml vial  Inject 0.2 mL / 20 units per week 3 each 0    CUSTOM MEDICATION Semaglutide/ cynaocobalamin injection 1/0.5 mg/ ML   2.5 ml vial   Take 50 units aka 0.5 ml q weekly subcutaneously (Patient not taking: Reported on 7/11/2025) 3 each 0    CUSTOM MEDICATION Semaglutide/ cynaocobalamin injection 1/0.5 mg/ ML   2.5 ml vial   Take 50 units aka 0.5 ml q weekly (Patient not taking: Reported on 7/11/2025) 1 each 0    ergocalciferol 1.25 MG (28429 UT) Oral Cap Take 1 capsule (50,000 Units total) by mouth once a week. With food for 12 weeks total then begin OTC Vitamin D at 2000 units daily with food thereafter 12 capsule 0    omeprazole 20 MG Oral Capsule Delayed Release Take 1 capsule (20 mg total) by mouth 2 (two) times daily before meals. 180 capsule 3    albuterol 108 (90 Base) MCG/ACT Inhalation Aero Soln Inhale 2 puffs into the lungs every 4 (four) hours as needed for Wheezing. 18 g 0    diazepam 5 MG Oral Tab Take 1 tablet (5 mg total) by mouth every 12 (twelve) hours as needed for Anxiety.

## 2025-07-14 NOTE — DISCHARGE INSTRUCTIONS
Ankle Fracture Fixation  Post-Operative Guidelines    This document will help you plan for your post-operative recovery course following surgery. Please read and retain this information for future reference. Many of the questions you may have later can be answered by referring to this information.    DIET   Begin with clear liquids and light foods (jellos, soups, etc.).   Progress to your normal diet if you are not nauseated.     ACTIVITY   In the recovery room, pillows will be place under your lower leg to keep the swelling down.  This is a critical time in your recovery.    When you get home, you MUST TRY AND KEEP YOUR FOOT ELEVATED as much as possible until you come in and see us.    This helps your incision to heal without opening up while also keep the swelling down.    If surgery is on your right foot, you will not be able to drive for approximately 6 weeks.    If surgery is on your left foot, you may drive an automatic car once you to go into the boot (approximately 2 weeks after surgery) and are not taking any pain medication.     SPLINT  You will have a splint on your leg from your knee to your toes.  This will come off in 7-14 days.  You must use crutches and not put any weight on your foot so as not to harm the repair and damage this splint.  You will be transitioned from a splint into a boot at your first follow-up visit to be worn for a total duration of 4-6 weeks after your surgery.     WOUND CARE   Keep the splint clean and dry - it will be removed at your first post-operative visit.   You may begin showering the day after your surgery.   To shower, use a waterproof shower bag (can be purchased on [employer]) or a well-sealed Saran wrap to keep the splint dry.   Do not apply creams, ointment, or lotions to your incisions while they are healing (4 weeks).     NORMAL SENSATIONS AND FINDINGS AFTER SURGERY  Pain  Toe swelling / stiffness up to 2 weeks  Numbness and tingling in the fingers, this should resolve  in 36 hours.   Bruising  Low grade temperature less than 101.0 for up to a week after surgery      You received a drug called Toradol which is an Anti Inflammatory at: 3:43pm.  If you are allowed to take Anti inflammatories:    Do not take any Anti Inflammatory like Motrin, Aleve or Ibuprophen until after: 9:43pm.  Please report any suspected allergic reactions or bleeding issues to your doctor

## 2025-07-15 ENCOUNTER — HOSPITAL ENCOUNTER (OUTPATIENT)
Facility: HOSPITAL | Age: 30
Setting detail: HOSPITAL OUTPATIENT SURGERY
Discharge: HOME | End: 2025-07-15
Attending: ORTHOPAEDIC SURGERY | Admitting: ORTHOPAEDIC SURGERY
Payer: MEDICAID

## 2025-07-15 ENCOUNTER — APPOINTMENT (OUTPATIENT)
Dept: GENERAL RADIOLOGY | Facility: HOSPITAL | Age: 30
End: 2025-07-15
Attending: ORTHOPAEDIC SURGERY
Payer: MEDICAID

## 2025-07-15 ENCOUNTER — ANESTHESIA (OUTPATIENT)
Dept: SURGERY | Facility: HOSPITAL | Age: 30
End: 2025-07-15
Payer: MEDICAID

## 2025-07-15 ENCOUNTER — ANESTHESIA EVENT (OUTPATIENT)
Dept: SURGERY | Facility: HOSPITAL | Age: 30
End: 2025-07-15
Payer: MEDICAID

## 2025-07-15 VITALS
HEART RATE: 72 BPM | OXYGEN SATURATION: 98 % | WEIGHT: 264 LBS | RESPIRATION RATE: 20 BRPM | DIASTOLIC BLOOD PRESSURE: 78 MMHG | SYSTOLIC BLOOD PRESSURE: 126 MMHG | TEMPERATURE: 97 F | HEIGHT: 73 IN | BODY MASS INDEX: 34.99 KG/M2

## 2025-07-15 DIAGNOSIS — Z48.89 AFTERCARE FOLLOWING SURGERY: Primary | ICD-10-CM

## 2025-07-15 PROCEDURE — 76942 ECHO GUIDE FOR BIOPSY: CPT | Performed by: ANESTHESIOLOGY

## 2025-07-15 PROCEDURE — 76000 FLUOROSCOPY <1 HR PHYS/QHP: CPT | Performed by: ORTHOPAEDIC SURGERY

## 2025-07-15 DEVICE — IMPLANTABLE DEVICE: Type: IMPLANTABLE DEVICE | Site: ANKLE | Status: FUNCTIONAL

## 2025-07-15 RX ORDER — KETOROLAC TROMETHAMINE 30 MG/ML
INJECTION, SOLUTION INTRAMUSCULAR; INTRAVENOUS AS NEEDED
Status: DISCONTINUED | OUTPATIENT
Start: 2025-07-15 | End: 2025-07-15 | Stop reason: SURG

## 2025-07-15 RX ORDER — METOCLOPRAMIDE HYDROCHLORIDE 5 MG/ML
INJECTION INTRAMUSCULAR; INTRAVENOUS AS NEEDED
Status: DISCONTINUED | OUTPATIENT
Start: 2025-07-15 | End: 2025-07-15 | Stop reason: SURG

## 2025-07-15 RX ORDER — TRANEXAMIC ACID 10 MG/ML
INJECTION, SOLUTION INTRAVENOUS AS NEEDED
Status: DISCONTINUED | OUTPATIENT
Start: 2025-07-15 | End: 2025-07-15 | Stop reason: SURG

## 2025-07-15 RX ORDER — ACETAMINOPHEN 500 MG
1000 TABLET ORAL ONCE AS NEEDED
Status: DISCONTINUED | OUTPATIENT
Start: 2025-07-15 | End: 2025-07-15

## 2025-07-15 RX ORDER — MEPERIDINE HYDROCHLORIDE 25 MG/ML
12.5 INJECTION INTRAMUSCULAR; INTRAVENOUS; SUBCUTANEOUS AS NEEDED
Status: DISCONTINUED | OUTPATIENT
Start: 2025-07-15 | End: 2025-07-15

## 2025-07-15 RX ORDER — LABETALOL HYDROCHLORIDE 5 MG/ML
5 INJECTION, SOLUTION INTRAVENOUS EVERY 5 MIN PRN
Status: DISCONTINUED | OUTPATIENT
Start: 2025-07-15 | End: 2025-07-15

## 2025-07-15 RX ORDER — METOPROLOL TARTRATE 1 MG/ML
2.5 INJECTION, SOLUTION INTRAVENOUS ONCE
Status: DISCONTINUED | OUTPATIENT
Start: 2025-07-15 | End: 2025-07-15

## 2025-07-15 RX ORDER — ONDANSETRON 2 MG/ML
4 INJECTION INTRAMUSCULAR; INTRAVENOUS EVERY 6 HOURS PRN
Status: DISCONTINUED | OUTPATIENT
Start: 2025-07-15 | End: 2025-07-15

## 2025-07-15 RX ORDER — LIDOCAINE HYDROCHLORIDE 10 MG/ML
INJECTION, SOLUTION EPIDURAL; INFILTRATION; INTRACAUDAL; PERINEURAL AS NEEDED
Status: DISCONTINUED | OUTPATIENT
Start: 2025-07-15 | End: 2025-07-15 | Stop reason: SURG

## 2025-07-15 RX ORDER — NALOXONE HYDROCHLORIDE 0.4 MG/ML
80 INJECTION, SOLUTION INTRAMUSCULAR; INTRAVENOUS; SUBCUTANEOUS AS NEEDED
Status: DISCONTINUED | OUTPATIENT
Start: 2025-07-15 | End: 2025-07-15

## 2025-07-15 RX ORDER — PROCHLORPERAZINE EDISYLATE 5 MG/ML
5 INJECTION INTRAMUSCULAR; INTRAVENOUS EVERY 8 HOURS PRN
Status: DISCONTINUED | OUTPATIENT
Start: 2025-07-15 | End: 2025-07-15

## 2025-07-15 RX ORDER — DEXAMETHASONE SODIUM PHOSPHATE 4 MG/ML
VIAL (ML) INJECTION AS NEEDED
Status: DISCONTINUED | OUTPATIENT
Start: 2025-07-15 | End: 2025-07-15 | Stop reason: SURG

## 2025-07-15 RX ORDER — SCOPOLAMINE 1 MG/3D
1 PATCH, EXTENDED RELEASE TRANSDERMAL ONCE
Status: DISCONTINUED | OUTPATIENT
Start: 2025-07-15 | End: 2025-07-15 | Stop reason: HOSPADM

## 2025-07-15 RX ORDER — IBUPROFEN 400 MG/1
400 TABLET, FILM COATED ORAL EVERY 6 HOURS PRN
COMMUNITY

## 2025-07-15 RX ORDER — HYDROMORPHONE HYDROCHLORIDE 1 MG/ML
0.2 INJECTION, SOLUTION INTRAMUSCULAR; INTRAVENOUS; SUBCUTANEOUS EVERY 5 MIN PRN
Status: DISCONTINUED | OUTPATIENT
Start: 2025-07-15 | End: 2025-07-15

## 2025-07-15 RX ORDER — ACETAMINOPHEN 500 MG
1000 TABLET ORAL ONCE
Status: DISCONTINUED | OUTPATIENT
Start: 2025-07-15 | End: 2025-07-15 | Stop reason: HOSPADM

## 2025-07-15 RX ORDER — HYDROCODONE BITARTRATE AND ACETAMINOPHEN 5; 325 MG/1; MG/1
1 TABLET ORAL ONCE AS NEEDED
Status: DISCONTINUED | OUTPATIENT
Start: 2025-07-15 | End: 2025-07-15

## 2025-07-15 RX ORDER — CEFAZOLIN SODIUM IN 0.9 % NACL 3 G/100 ML
3 INTRAVENOUS SOLUTION, PIGGYBACK (ML) INTRAVENOUS ONCE
Status: COMPLETED | OUTPATIENT
Start: 2025-07-15 | End: 2025-07-15

## 2025-07-15 RX ORDER — MIDAZOLAM HYDROCHLORIDE 1 MG/ML
1 INJECTION INTRAMUSCULAR; INTRAVENOUS EVERY 5 MIN PRN
Status: DISCONTINUED | OUTPATIENT
Start: 2025-07-15 | End: 2025-07-15

## 2025-07-15 RX ORDER — MIDAZOLAM HYDROCHLORIDE 1 MG/ML
INJECTION INTRAMUSCULAR; INTRAVENOUS AS NEEDED
Status: DISCONTINUED | OUTPATIENT
Start: 2025-07-15 | End: 2025-07-15 | Stop reason: SURG

## 2025-07-15 RX ORDER — GLYCOPYRROLATE 0.2 MG/ML
INJECTION, SOLUTION INTRAMUSCULAR; INTRAVENOUS AS NEEDED
Status: DISCONTINUED | OUTPATIENT
Start: 2025-07-15 | End: 2025-07-15 | Stop reason: SURG

## 2025-07-15 RX ORDER — HYDROCODONE BITARTRATE AND ACETAMINOPHEN 5; 325 MG/1; MG/1
2 TABLET ORAL ONCE AS NEEDED
Status: DISCONTINUED | OUTPATIENT
Start: 2025-07-15 | End: 2025-07-15

## 2025-07-15 RX ORDER — HYDROMORPHONE HYDROCHLORIDE 1 MG/ML
0.4 INJECTION, SOLUTION INTRAMUSCULAR; INTRAVENOUS; SUBCUTANEOUS EVERY 5 MIN PRN
Status: DISCONTINUED | OUTPATIENT
Start: 2025-07-15 | End: 2025-07-15

## 2025-07-15 RX ORDER — HYDROMORPHONE HYDROCHLORIDE 1 MG/ML
0.6 INJECTION, SOLUTION INTRAMUSCULAR; INTRAVENOUS; SUBCUTANEOUS EVERY 5 MIN PRN
Status: DISCONTINUED | OUTPATIENT
Start: 2025-07-15 | End: 2025-07-15

## 2025-07-15 RX ORDER — SODIUM CHLORIDE, SODIUM LACTATE, POTASSIUM CHLORIDE, CALCIUM CHLORIDE 600; 310; 30; 20 MG/100ML; MG/100ML; MG/100ML; MG/100ML
INJECTION, SOLUTION INTRAVENOUS CONTINUOUS
Status: DISCONTINUED | OUTPATIENT
Start: 2025-07-15 | End: 2025-07-15

## 2025-07-15 RX ORDER — ROCURONIUM BROMIDE 10 MG/ML
INJECTION, SOLUTION INTRAVENOUS AS NEEDED
Status: DISCONTINUED | OUTPATIENT
Start: 2025-07-15 | End: 2025-07-15 | Stop reason: SURG

## 2025-07-15 RX ORDER — ONDANSETRON 2 MG/ML
INJECTION INTRAMUSCULAR; INTRAVENOUS AS NEEDED
Status: DISCONTINUED | OUTPATIENT
Start: 2025-07-15 | End: 2025-07-15 | Stop reason: SURG

## 2025-07-15 RX ORDER — NEOSTIGMINE METHYLSULFATE 1 MG/ML
INJECTION INTRAVENOUS AS NEEDED
Status: DISCONTINUED | OUTPATIENT
Start: 2025-07-15 | End: 2025-07-15 | Stop reason: SURG

## 2025-07-15 RX ADMIN — SODIUM CHLORIDE, SODIUM LACTATE, POTASSIUM CHLORIDE, CALCIUM CHLORIDE: 600; 310; 30; 20 INJECTION, SOLUTION INTRAVENOUS at 16:37:00

## 2025-07-15 RX ADMIN — KETOROLAC TROMETHAMINE 30 MG: 30 INJECTION, SOLUTION INTRAMUSCULAR; INTRAVENOUS at 15:43:00

## 2025-07-15 RX ADMIN — LIDOCAINE HYDROCHLORIDE 50 MG: 10 INJECTION, SOLUTION EPIDURAL; INFILTRATION; INTRACAUDAL; PERINEURAL at 13:01:00

## 2025-07-15 RX ADMIN — ONDANSETRON 4 MG: 2 INJECTION INTRAMUSCULAR; INTRAVENOUS at 15:43:00

## 2025-07-15 RX ADMIN — ROCURONIUM BROMIDE 10 MG: 10 INJECTION, SOLUTION INTRAVENOUS at 14:42:00

## 2025-07-15 RX ADMIN — ROCURONIUM BROMIDE 20 MG: 10 INJECTION, SOLUTION INTRAVENOUS at 13:39:00

## 2025-07-15 RX ADMIN — NEOSTIGMINE METHYLSULFATE 2 MG: 1 INJECTION INTRAVENOUS at 16:19:00

## 2025-07-15 RX ADMIN — GLYCOPYRROLATE 0.4 MG: 0.2 INJECTION, SOLUTION INTRAMUSCULAR; INTRAVENOUS at 16:19:00

## 2025-07-15 RX ADMIN — CEFAZOLIN SODIUM IN 0.9 % NACL 3 G: 3 G/100 ML INTRAVENOUS SOLUTION, PIGGYBACK (ML) INTRAVENOUS at 13:14:00

## 2025-07-15 RX ADMIN — DEXAMETHASONE SODIUM PHOSPHATE 6 MG: 4 MG/ML VIAL (ML) INJECTION at 13:22:00

## 2025-07-15 RX ADMIN — METOCLOPRAMIDE HYDROCHLORIDE 10 MG: 5 INJECTION INTRAMUSCULAR; INTRAVENOUS at 15:43:00

## 2025-07-15 RX ADMIN — TRANEXAMIC ACID 1000 MG: 10 INJECTION, SOLUTION INTRAVENOUS at 13:24:00

## 2025-07-15 RX ADMIN — SODIUM CHLORIDE, SODIUM LACTATE, POTASSIUM CHLORIDE, CALCIUM CHLORIDE: 600; 310; 30; 20 INJECTION, SOLUTION INTRAVENOUS at 15:00:00

## 2025-07-15 RX ADMIN — ROCURONIUM BROMIDE 20 MG: 10 INJECTION, SOLUTION INTRAVENOUS at 14:18:00

## 2025-07-15 RX ADMIN — MIDAZOLAM HYDROCHLORIDE 2 MG: 1 INJECTION INTRAMUSCULAR; INTRAVENOUS at 12:50:00

## 2025-07-15 NOTE — OPERATIVE REPORT
OPERATIVE RECORD  ATTENDING SURGEON: PIERRE VILLALTA MD  ASSISTANT(S): Shantanu Reynolds (AJ)  STATEMENT OF MEDICAL NECESSITY  The aid of physician assistant Shantanu Reynolds (AJ) was needed for patient positioning, preparation, drape, retraction,  wound closure, dressing application and critical portions of the procedure.     PRELIMINARY DIAGNOSIS:  1.  Left closed trimalleolar ankle fracture  2.  Left ankle syndesmotic disruption     POSTOPERATIVE DIAGNOSIS:  1.  Left closed trimalleolar ankle fracture  2.  Left ankle syndesmotic disruption  THE OPERATION:  1.  Left open reduction internal fixation of trimalleolar ankle fracture (81351)   2.  Left open reduction internal fixation of syndesmosis (51821)    Modifier 22: Patient BMI 35, prone positioning and complexity of injury pattern added additional surgical time of 30%.   ANESTHESIA: GA + Regional Block  ANESTHESIOLOGIST:  MD Ant  ANTIBIOTICS: Cefazolin 2g within 60 minutes of surgical incision.    IMPLANTS:   Implant Name Type Inv. Item Serial No.  Lot No. LRB No. Used Action   PLATE BNE 2.4MM 8 H TRIANG TI MINI COMPHSVE - SNA  PLATE BNE 2.4MM 8 H TRIANG TI MINI COMPHSVE NA [employer] [facility] [id number] Left 1 Implanted   SCREW BNE 2.4X38MM TERESA TI LP WRST - SNA  SCREW BNE 2.4X38MM TERESA TI LP WRST NA [employer] [facility] [id number] Left 1 Implanted   CORTICAL SCR TI 2.4 X 48MM - SNA  CORTICAL SCR TI 2.4 X 48MM NA [employer] [facility] [id number] Left 1 Implanted   2.4x46mm cortical screw   NA [employer] [facility] [id number] Left 2 Implanted   SCREW BNE 2.4X30MM TERESA TI LP WRST - SNA  SCREW BNE 2.4X30MM TERESA TI LP WRST NA [employer] [facility] [id number] Left 1 Implanted     PATHOLOGY/CULTURES: None  ESTIMATED BLOOD LOSS: Blood Output: 50 mL (7/15/2025  3:58 PM)  DRAINS: None  COMPLICATIONS: No intraoperative nor immediate postoperative complications noted.  COUNTS: All sponge and needle counts were correct at the conclusion of the procedure.  TOURNIQUET  TIME: 14 Minutes  OPERATIVE FINDINGS:  Anatomic reduction and stable fixation of left trimalleolar ankle fracture. Open fixation and stabilization of posterior malleolus.  PITFL syndesmotic stabilization with suture based repair.     Indications:  Simba is a 30 year old male with history, physical examination, and imaging findings consistent with displaced trimalleolar ankle fracture and concern for syndesmotic injury, amenable to operative management.  This merits timely intervention to restore stability and allow her to ambulate without any challenges.  Operative and nonoperative options were discussed with him in my office and we ultimately agreed that surgery would provide the highest likelihood of symptomatic relief and functional improvement.  The risks and benefits of surgery as well as the postoperative rehabilitation plan were reviewed. He voiced a good understanding of treatment options, risks and benefits, and alternatives to surgery. He was given the opportunity to ask questions, which were all answered to the best of my ability and to his satisfaction. Simba wished to proceed.   On the day of surgery, the Simba was seen in the preoperative area.  I confirmed his identity by name and birthday. We reviewed and confirmed the surgical consent including laterality.  The surgical site was marked with my initials.  We re-reviewed the risks and benefits of the procedure and I answered all additional questions to his satisfaction.      OPERATIVE REPORT:   Simba was taken to the operating room in stable condition.    The patient was positioned in the prone position. They subsequently underwent standard prep and drape. A nonsterile tourniquet was applied to the operative limb.  A preoperative timeout was performed confirming the correct surgical site, procedure, and preoperative imaging was reviewed.      Exam under anesthesia demonstrated a grossly unstable ankle.      After time out, the procedure commenced with  #10 blade for longitudinal posterolateral approach to the distal tibia.  The fascial planes were carefully dissected with Metzenbaum scissor and elevator and carefully incised in the interval between the peroneal tendons and the flexor houses longus.  Care was taken to identify the short saphenous vein and sural nerve and retract this medially.  Careful dissection was performed to elevate the peroneal tendons from the flexor houses longus and the deep fascia was incised just medial to the fibula.  This was done with a Delta with careful progression of hemostasis.  The peroneal vessels were identified and protected.  The flexor houses longus was dissected off the posterior aspect of the tibia more proximally with elevator and the muscle belly was retracted appropriately.  At this time the fracture fragment was identified and a deep knife was utilized to incise through the posterior tibiofibular ligament to access the fracture plane.  This was carefully manipulated with a Princess elevator and an intercalary fragment which was osteochondral was identified and extracted facilitating reduction.  The posterior malleolus was then reduced with the aid of a ball spike pusher and cortical reduction was felt to be accurate indicating appropriate length and rotation of the posterior fragment which was confirmed fluoroscopically.  A single 4.5 mm K wire was then introduced to hold the reduction.    Notably tourniquet was only utilized for the approach and then was released for the remainder of the procedure.    A posterior-based 2.4 mm plate was then selected by [employer].  This was first secured immediately proximal to the posterior malleolus fracture at the axilla and then distal to these were placed x 3 allowing for further compression and anatomic reduction of the ankle mortise.  This was followed by placement of an additional proximal screw facilitating excellent quality of reduction.    Stress examination was performed and did  not appear to be significant syndesmotic instability the posterior inferior tibiofibular ligament was observed to be intact although slight laceration was noted which was repaired with the aid of 0 Vicryl suture.  2 figure-of-eight components were utilized to facilitate this repair.  This time the manipulative reduction of the posterior malleolus as well as closed treatment of the proximal fibula fracture and medial malleolus fracture was deemed appropriate.  The syndesmosis was stabilized with fixation of the posterior malleolus as well as suture repair.     The wound itself was then thoroughly irrigated the deep fascia was then closed in running fashion with 2-0 Monocryl on the lateral side overlying the peroneal tendons.  On the medial side deep 3-0 Monocryl was used followed by interrupted nylon and intervening brown Steri-Strips.  On the lateral side running interrupted 3-0 Monocryl with interrupted 3-0 Nylon was utilized with intervening steri strips.  The patient was subsequently placed into a well-padded posterior-based splint.    The final count prior to closure was correct. The patient tolerated the procedure well without complications.   Simba was taken to the recovery room in a stable condition with plan for ambulatory discharge to home. He was provided my postoperative discharge booklet, appropriate home exercises, script for outpatient physical therapy, and a copy of my rehabilitation guidelines.      POST OPERATIVE PLAN:  Activity Precautions: Splint x NWB for 4 weeks, partial progressive WB after this time.   DVT Prophylaxis: ASA 81 mg daily x 4 weeks  Follow-up Visit: POD #6-9        Liliana Vasquez MD  Knee, Shoulder, & Elbow Surgery / Sports Medicine Specialist  Orthopaedic Surgery  [address] [place] [address] [id number] [place] [email or web address]  [email or web address]  t: [phone]  o: [phone]  f: [phone]      This note was dictated using Dragon software.  While via [employer] was  briefly proofread prior to completion, some grammatical, spelling, and word choice errors due to dictation may still occur.

## 2025-07-15 NOTE — ANESTHESIA PREPROCEDURE EVALUATION
PRE-OP EVALUATION    Patient Name: Simba Garza    Admit Diagnosis: Closed fracture of tibial plafond [S82.879X]    Pre-op Diagnosis: Closed fracture of tibial plafond [S82.033A]    Left Ankle Open Reduction Internal Fixation Trimalleolar Ankle Fracture Open Reduction Internal Fixation Syndesmosis    Anesthesia Procedure: Left Ankle Open Reduction Internal Fixation Trimalleolar Ankle Fracture Open Reduction Internal Fixation Syndesmosis (Left)    Surgeons and Role:     * Liliana Vasquez MD - Primary    Pre-op vitals reviewed.        Body mass index is 34.96 kg/m².    Current medications reviewed.  Hospital Medications:  Current Medications[1]    Outpatient Medications:   Prescriptions Prior to Admission[2]    Allergies: Patient has no known allergies.      Anesthesia Evaluation    Patient summary reviewed.    Anesthetic Complications  (-) history of anesthetic complications         GI/Hepatic/Renal      (+) GERD                           Cardiovascular      ECG reviewed.  Exercise tolerance: good     MET: >4                                           Endo/Other                                  Pulmonary      (+) asthma           (-) recent URI          Neuro/Psych                                      Past Surgical History[3]  Social Hx on file[4]  History   Drug Use No     Available pre-op labs reviewed.               Airway      Mallampati: II  Mouth opening: >3 FB  TM distance: > 6 cm  Neck ROM: full Cardiovascular      Rhythm: regular  Rate: normal     Dental             Pulmonary      Breath sounds clear to auscultation bilaterally.               Other findings              ASA: 2   Plan: general  NPO status verified and patient meets guidelines.    Post-procedure pain management plan discussed with surgeon and patient.  Surgeon requests: regional block  Comment: D/w the patient the risks and benefits of general anesthesia including sore throat, nausea, and intraoperative awareness.  The risks and benefits  of a nerve block for postoperative pain management were d/w pt including nerve injury, bleeding and infection.    Plan/risks discussed with: patient                Present on Admission:  **None**             [1]    acetaminophen (Tylenol Extra Strength) tab 1,000 mg  1,000 mg Oral Once    scopolamine (Transderm-Scop) 1 MG/3DAYS patch 1 patch  1 patch Transdermal Once    lactated ringers infusion   Intravenous Continuous    ceFAZolin (Ancef) 3 g in sodium chloride 0.9% 100mL IVPB premix  3 g Intravenous Once   [2]   Medications Prior to Admission   Medication Sig Dispense Refill Last Dose/Taking    HYDROcodone-acetaminophen 5-325 MG Oral Tab Take 1 tablet by mouth every 6 (six) hours as needed. 10 tablet 0 Taking As Needed    ondansetron 4 MG Oral Tablet Dispersible Take 1 tablet (4 mg total) by mouth every 4 (four) hours as needed for Nausea. 10 tablet 0 Taking As Needed    Naloxone HCl 4 MG/0.1ML Nasal Liquid 4 mg by Intranasal route as needed (May repeat as needed in other nostril if symptoms persist). If patient remains unresponsive, repeat dose in other nostril 2-5 minutes after first dose. 1 kit 0 Taking As Needed    Budesonide-Formoterol Fumarate (SYMBICORT) 160-4.5 MCG/ACT Inhalation Aerosol Inhale 2 puffs into the lungs 2 (two) times daily. 30.6 g 3 Taking    omeprazole 20 MG Oral Capsule Delayed Release Take 1 capsule (20 mg total) by mouth 2 (two) times daily before meals. 180 capsule 3 Taking    albuterol 108 (90 Base) MCG/ACT Inhalation Aero Soln Inhale 2 puffs into the lungs every 4 (four) hours as needed for Wheezing. 18 g 0 Taking As Needed    diazepam 5 MG Oral Tab Take 1 tablet (5 mg total) by mouth every 12 (twelve) hours as needed for Anxiety.   Taking As Needed   [3]   Past Surgical History:  Procedure Laterality Date    Colonoscopy      Upper gi endoscopy,exam     [4]   Social History  Socioeconomic History    Marital status: Single   Tobacco Use    Smoking status: Never    Smokeless tobacco:  Never   Vaping Use    Vaping status: Never Used   Substance and Sexual Activity    Alcohol use: Not Currently     Comment: no alcohol in 5 years    Drug use: No

## 2025-07-15 NOTE — ANESTHESIA POSTPROCEDURE EVALUATION
[facility]    Simba Garza Patient Status:  Hospital Outpatient Surgery   Age/Gender 30 year old male MRN [id number]   Location [facility] POST ANESTHESIA CARE UNIT Attending Liliana Vasquez MD   Hosp Day # 0 PCP Asaf An MD       Anesthesia Post-op Note    Left Ankle Open Reduction Internal Fixation Trimalleolar Ankle Fracture Open Reduction Internal Fixation Syndesmosis    Procedure Summary       Date: 07/15/25 Room / Location:  MAIN OR 12 /  MAIN OR    Anesthesia Start: 1250 Anesthesia Stop: 1637    Procedure: Left Ankle Open Reduction Internal Fixation Trimalleolar Ankle Fracture Open Reduction Internal Fixation Syndesmosis (Left: Ankle) Diagnosis:       Closed fracture of tibial plafond      (Closed fracture of tibial plafond [S82.873A])    Surgeons: Liliana Vasquez MD Anesthesiologist: Zachary Cain MD    Anesthesia Type: general ASA Status: 2            Anesthesia Type: general    Vitals Value Taken Time   /80 07/15/25 16:38   Temp 97.8 07/15/25 16:38   Pulse 72 07/15/25 16:38   Resp 18 07/15/25 16:38   SpO2 97 07/15/25 16:38           Patient Location: PACU    Anesthesia Type: general    Airway Patency: patent    Postop Pain Control: adequate    Mental Status: mildly sedated but able to meaningfully participate in the post-anesthesia evaluation    Nausea/Vomiting: none    Cardiopulmonary/Hydration status: stable euvolemic    Complications: no apparent anesthesia related complications    Postop vital signs: stable    Dental Exam: Unchanged from Preop    Patient to be discharged from PACU when criteria met.

## 2025-07-15 NOTE — ANESTHESIA PROCEDURE NOTES
Airway  Date/Time: 7/15/2025 1:02 PM  Reason: elective    Airway not difficult    General Information and Staff   Patient location during procedure: OR  Anesthesiologist: Zachary Cain MD  Performed: anesthesiologist   Performed by: Zachary Cain MD  Authorized by: Zachary Cain MD        Indications and Patient Condition  Indications for airway management: anesthesia  Sedation level: deep      Preoxygenated: yesPatient position: sniffing  MILS maintained throughout    Mask difficulty assessment: 0 - not attempted    Final Airway Details    Final airway type: endotracheal airway    Successful airway: ETT  Cuffed: yes   Successful intubation technique: direct laryngoscopy  Endotracheal tube insertion site: oral  Blade: Winter  Blade size: #4  ETT size (mm): 8.0    Cormack-Lehane Classification: grade I - full view of glottis  Placement verified by: capnometry   Cuff volume (mL): 5  Measured from: lips  ETT to lips (cm): 25  Number of attempts at approach: 1        
Regional Block    Date/Time: 7/15/2025 1:05 PM    Performed by: Zachary Cain MD  Authorized by: Zachary Cain MD      General Information and Staff    Start Time:  7/15/2025 1:05 PM  End Time:  7/15/2025 1:07 PM  Anesthesiologist:  Zachary Cain MD  Performed by:  Anesthesiologist  Patient Location:  OR    Block Placement: Post Induction  Site Identification: real time ultrasound guided and image stored and retrievable    Block site/laterality marked before start: site marked  Reason for Block: at surgeon's request and post-op pain management    Preanesthetic Checklist: 2 patient identifers, IV checked, site marked, risks and benefits discussed, monitors and equipment checked, pre-op evaluation, timeout performed, anesthesia consent, sterile technique used, no prohibitive neurological deficits and no local skin infection at insertion site      Procedure Details    Patient Position:  Supine  Prep: ChloraPrep    Monitoring:  Cardiac monitor, continuous pulse ox, blood pressure cuff and heart rate  Block Type:  Adductor canal  Laterality:  Left  Injection Technique:  Single-shot    Needle    Needle Type:  Short-bevel and echogenic  Needle Gauge:  21 G  Needle Length:  100 mm  Needle Localization:  Ultrasound guidance  Reason for Ultrasound Use: appropriate spread of the medication was noted in real time and no ultrasound evidence of intravascular and/or intraneural injection            Assessment    Injection Assessment:  Good spread noted, negative resistance, negative aspiration for heme, incremental injection, low pressure and local visualized surrounding nerve on ultrasound  Heart Rate Change: No    - Patient tolerated block procedure well without evidence of immediate block related complications.     Medications  7/15/2025 1:05 PM      Additional Comments    Ropivicaine 0.375% 20ml + dexamethasone PF 2mg           
Regional Block    Date/Time: 7/15/2025 1:08 PM    Performed by: Zachary Cain MD  Authorized by: Zachary Cain MD      General Information and Staff    Start Time:  7/15/2025 1:08 PM  End Time:  7/15/2025 1:11 PM  Anesthesiologist:  Zachary Cain MD  Performed by:  Anesthesiologist  Patient Location:  OR    Block Placement: Post Induction  Site Identification: real time ultrasound guided and image stored and retrievable    Block site/laterality marked before start: site marked  Reason for Block: at surgeon's request and post-op pain management    Preanesthetic Checklist: 2 patient identifers, IV checked, site marked, risks and benefits discussed, monitors and equipment checked, pre-op evaluation, timeout performed, anesthesia consent, sterile technique used, no prohibitive neurological deficits and no local skin infection at insertion site      Procedure Details    Patient Position:  Supine  Prep: ChloraPrep    Monitoring:  Cardiac monitor, continuous pulse ox, blood pressure cuff and heart rate  Block Type:  Popliteal  Laterality:  Right  Injection Technique:  Single-shot    Needle    Needle Type:  Short-bevel and echogenic  Needle Gauge:  21 G  Needle Length:  100 mm  Needle Localization:  Ultrasound guidance  Reason for Ultrasound Use: appropriate spread of the medication was noted in real time and no ultrasound evidence of intravascular and/or intraneural injection            Assessment    Injection Assessment:  Good spread noted, negative resistance, negative aspiration for heme, incremental injection, low pressure and local visualized surrounding nerve on ultrasound  Heart Rate Change: No    - Patient tolerated block procedure well without evidence of immediate block related complications.     Medications  7/15/2025 1:08 PM      Additional Comments    Ropivicaine 0.375% 20ml + dexamethasone PF 2mg         
04-Oct-2022 11:16

## 2025-07-18 ENCOUNTER — PATIENT MESSAGE (OUTPATIENT)
Dept: ORTHOPEDICS CLINIC | Facility: CLINIC | Age: 30
End: 2025-07-18

## 2025-07-22 ENCOUNTER — OFFICE VISIT (OUTPATIENT)
Dept: ORTHOPEDICS CLINIC | Facility: CLINIC | Age: 30
End: 2025-07-22
Payer: MEDICAID

## 2025-07-22 DIAGNOSIS — Z87.81 S/P ORIF (OPEN REDUCTION INTERNAL FIXATION) FRACTURE: Primary | ICD-10-CM

## 2025-07-22 DIAGNOSIS — Z98.890 S/P ORIF (OPEN REDUCTION INTERNAL FIXATION) FRACTURE: Primary | ICD-10-CM

## 2025-07-22 PROCEDURE — 99024 POSTOP FOLLOW-UP VISIT: CPT | Performed by: PHYSICIAN ASSISTANT

## 2025-07-22 NOTE — PROGRESS NOTES
[facility] ORTHOPEDICS  [address] [place] [id number]  [phone]       Name: Simba Garza   MRN: [id number]  Date: 7/22/2025     REASON FOR VISIT: First Post-Surgical Visit   Surgery: Left ankle ORIF of trimalleolar fracture, and syndesmosis on 07/15/2025.     INTERVAL HISTORY:  Simba Garza is a 30 year old male who returns after the aforementioned procedure.  The post-operative course has been unremarkable with pain well controlled and overall progress noted.     Physical therapy was started and is progressing well.  The patient denies any calf pain or tenderness, fevers, chills, sweats or signs of active infection. The patient has been compliant with the postoperative protocol, and admits to normal bowel and bladder function. No acute issues.     ROS: ROS    PE:   There were no vitals filed for this visit.  Estimated body mass index is 34.83 kg/m² as calculated from the following:    Height as of 7/14/25: 6' 1\" (1.854 m).    Weight as of 7/15/25: 264 lb (119.7 kg).    Physical Exam  Constitutional:       Appearance: Normal appearance.   HENT:      Head: Normocephalic and atraumatic.   Eyes:      Extraocular Movements: Extraocular movements intact.   Neck:      Musculoskeletal: Normal range of motion and neck supple.   Cardiovascular:      Pulses: Normal pulses.   Pulmonary:      Effort: Pulmonary effort is normal. No respiratory distress.   Abdominal:      General: There is no distension.   Skin:     General: Skin is warm.      Capillary Refill: Capillary refill takes less than 2 seconds.      Findings: No bruising.   Neurological:      General: No focal deficit present.      Mental Status: She is alert.   Psychiatric:         Mood and Affect: Mood normal.     Examination of the left ankle demonstrates:     The patient is alert and oriented x 3, well-developed, nourished, no acute distress.  Nylon sutures are intact no signs of active pathology. Calf is soft and  tender.    The contralateral ankle is without limitation in range of motion or strength, no positive provocative maneuvers.     Radiographic Examination/Diagnostics:    I personally viewed, independently interpreted and radiology report was reviewed.      XR FLUOROSCOPY C-ARM TIME LESS THAN 1 HOUR (CPT=76000)  Result Date: 7/15/2025  PROCEDURE: XR FLUOROSCOPY C-ARM TIME LESS THAN 1 HOUR (CPT=76000) INDICATIONS: Left Ankle Open Reduction Internal Fixation PATIENT STATED HISTORY: COMPARISON: No comparisons.     FINDINGS/CONCLUSION: Fluoroscopy was provided intraoperatively by the radiology technologist for OR purposes only. Fluoroscopy time 1 hour and 47 minutes. 10.21989 mGy fluoroscopy dose Electronically Verified and Signed by Attending Radiologist: Concepción Maria MD 7/15/2025 4:37 PM Workstation: [id number]    CT ANKLE LEFT (CPT=73700)  Result Date: 7/11/2025  PROCEDURE: CT ANKLE LEFT (CPT=73700) INDICATIONS: Status post left ankle injury, pain and swelling. PATIENT STATED HISTORY: Stepped on a toy and had left leg/ankle injury. COMPARISON: There are no comparisons for this exam. TECHNIQUE: Axial, helical CT imaging of the left ankle is performed with coronal and sagittal reformatted imaging as well as 3-dimensional reconstructed imaging. Exam was performed without contrast. FINDINGS: There is a disruption of the anterior tibiofibular syndesmosis which appears widened. There is an associated disruption of the interosseous membrane with a comminuted nondisplaced fracture of the proximal to mid fibular shaft. Of note, the cephalad portion of the fracture, which is extending towards the fibular head is not imaged on this study. There is an associated comminuted fracture extending through the medial malleolus and posterior malleolus. There is a posterolateral posterior malleolus fracture fragment that reveals mild posterior displacement by approximately 9 mm. The posterior malleoli are fracture reveals intra-articular  extension into the tibiotalar joint space. No additional osseous injuries are noted. Incidental note is made of a 1.2 cm os trigonum with pseudoarthrosis between the os and native talus. The visualized musculature about the ankle/distal leg reveals no evidence of acute strain, edema or significant muscle atrophy. No tendinopathy is suggested. No soft tissue mass lesions/hematomas appreciated.     CONCLUSION: 1.  There is a disruption of the distal tibiofibular syndesmosis extending through the anterior tibiofibular ligament with an associated disruption of the interosseous membrane and associated comminuted fracture of the proximal to mid fibular shaft. Of note, the very cephalad portion of the fracture plane extends towards the fibular head and is not within the field-of-view of this exam. 2.  There is an associated comminuted intra-articular fracture of the posterior malleolus that extends into the medial malleolus. There is a posterior lateral fracture fragment of the posterior malleolus that is posteriorly displaced by approximately 9 mm. Electronically Verified and Signed by Attending Radiologist: Radha Vasquez MD 7/11/2025 9:20 AM Workstation: [id number]    XR TIBIA + FIBULA (2 VIEWS), LEFT (CPT=73590)  Result Date: 7/11/2025  PROCEDURE: XR TIBIA + FIBULA (2 VIEWS), LEFT (CPT=73590) INDICATIONS: ankle injury PATIENT STATED HISTORY: Patient slipped on a stuffed animal and fell. COMPARISON: No comparisons. FINDINGS: There is a mildly displaced oblique comminuted fracture seen at the mid left fibula. There is a mildly displaced comminuted fracture again noted in the region of the posterior malleolus. There is apparent anterior subluxation/dislocation of the  tibia relative to the talus on the lateral view. Clinical correlation with physical exam is recommended. There is a mildly displaced fracture at the medial malleolus on the AP view.     CONCLUSION: See above. Electronically Verified and Signed by Attending  Radiologist: LeRoy Stromberg MD 7/11/2025 8:08 AM Workstation: [id number]    XR ANKLE (2 VIEW), LEFT (CPT=73600)  Result Date: 7/11/2025  PROCEDURE: XR ANKLE (2 VIEW), LEFT (CPT=73600) INDICATIONS: ankle injury pain COMPARISON: There are no comparisons for this exam. TECHNIQUE: AP and lateral radiographs of the left ankle FINDINGS: Subluxation of the tibiotalar articulation appears overall improved. A true ankle mortise view was not performed which limits assessment. There remains a mildly displaced fracture of the posterior malleolus with a nondisplaced fracture of the medial aspect of the tibia. Continued follow-up is recommended. OTHER:Negative.     CONCLUSION: See above. Electronically Verified and Signed by Attending Radiologist: Vladimir Hicks MD 7/11/2025 8:07 AM Workstation: [id number]    XR ANKLE (2 VIEW), LEFT (CPT=73600)  Result Date: 7/11/2025  PROCEDURE: XR ANKLE (2 VIEW), LEFT (CPT=73600) INDICATIONS: ankle injury PATIENT STATED HISTORY: Patient states left ankle pain after trip and fall. COMPARISON: No comparisons. FINDINGS: Suboptimal examination secondary to limited patient positioning. There is a mildly displaced likely comminuted fracture at the posterior malleolus. There is left ankle soft tissue swelling. There is an oblique lucency, partially visualized at the proximal left fibula. Dedicated radiographs of the left tibia and fibula are recommended for further assessment     CONCLUSION: See above. Electronically Verified and Signed by Attending Radiologist: LeRoy Stromberg MD 7/11/2025 6:38 AM Workstation: [id number]      IMPRESSION: Simba Garza is a 30 year old male who presents 1 week s/p Left ankle ORIF of trimalleolar fracture, and syndesmosis on 07/15/2025.     PLAN:   We had a lengthy discussion with the patient regarding the patient's findings consistent with the expected postoperative course. We recommend continuation of physical therapy with rehabilitation efforts focused on  strengthening, range of motion, functional ability, and return to baseline activity. The patient can continue to progress per protocol.    All questions were answered appropriately and the patient was in agreement with the treatment plan.       FOLLOW-UP:  4 weeks with x-rays.            Sushmar KORTNEY [facility], PA-C Orthopedic Surgery / Sports Medicine Specialist  [facility]  [address] [place] [address].org  [email or web address]  t: [phone]  o: [phone]  f: [phone]    This note was dictated using Dragon software.  While it was briefly proofread prior to completion, some grammatical, spelling, and word choice errors due to dictation may still occur.

## 2025-07-23 ENCOUNTER — OFFICE VISIT (OUTPATIENT)
Dept: PHYSICAL THERAPY | Age: 30
End: 2025-07-23
Attending: ORTHOPAEDIC SURGERY
Payer: MEDICAID

## 2025-07-23 DIAGNOSIS — S82.873A CLOSED FRACTURE OF TIBIAL PLAFOND: Primary | ICD-10-CM

## 2025-07-23 DIAGNOSIS — S82.452A CLOSED DISPLACED COMMINUTED FRACTURE OF SHAFT OF LEFT FIBULA, INITIAL ENCOUNTER: ICD-10-CM

## 2025-07-23 DIAGNOSIS — S93.432A SYNDESMOTIC DISRUPTION OF LEFT ANKLE, INITIAL ENCOUNTER: ICD-10-CM

## 2025-07-23 PROCEDURE — 97161 PT EVAL LOW COMPLEX 20 MIN: CPT

## 2025-07-23 PROCEDURE — 97140 MANUAL THERAPY 1/> REGIONS: CPT

## 2025-07-23 PROCEDURE — 97110 THERAPEUTIC EXERCISES: CPT

## 2025-07-23 NOTE — PROGRESS NOTES
LOWER EXTREMITY EVALUATION:     Diagnosis:   S/P ORIF (open reduction internal fixation) fracture (Z98.890,Z87.81) Patient:  Simba Garza (30 year old, male)        Referring Provider: Liliana Vasquez  Today's Date   7/23/2025    Precautions:      Date of Evaluation: 07/23/25  Next MD visit: 8/19/25  Date of Surgery: 7/15/25     PATIENT SUMMARY     Summary of chief complaints: post-surgical deficits after left ankle ORIF 7/15/25  History of current condition: The patient reports that his daughter called for him in the middle of the night. \"I stepped on a squishmallow and landed with all my weight on the left.\" He reports that he heard a pop. DOI: 7/11/25. The patient reports that his Dad is an ER doctor and waited for his Dad to pick him up. He went to [facility]. \"They tried to get it in place as much as they could but they told me to see Dr. Vasquez. The patient is going to be in [place] for his sister's wedding. He has surgery 7/15/25. The patient reports that he hasn't had to take any Tramadol and is able to manage his pain with Tylenol, taken every 6 hours.   Pain level: current 4 /10, at best 4 /10, at worst 9 /10     Occupation: [employer], on short-term disability   Leisure activities/Hobbies: family stuff - festivals, orchards; lifting weights for general health and fitness   Prior level of function: Patient was able to complete all ADL's and occupational activities without limitations.  Current limitations: WB through left LE for standing, walking, navigating stairs, and squatting.  Pt goals: \"Ankle physical therapy\"  Past medical history was reviewed with Simba.  Significant findings include: previous hairline fracture and high ankle sprain during a hockey injury at age [age].  Imaging/Tests: CT scan 7/11/25   Simba  has a past medical history of Abdominal pain, Asthma (HCC), Blood in the stool, Disorder of liver, Flatulence/gas pain/belching, History of 2019 novel coronavirus disease (COVID-19)  (12/30/2020), Pain with bowel movements, and Umbilical hernia.  He  has a past surgical history that includes colonoscopy; upper gi endoscopy,exam; and other surgical history (Left, 07/15/2025).    ASSESSMENT  Simba presents to physical therapy evaluation with primary c/o post-surgical deficits after left ankle ORIF 7/15/25. The results of the objective tests and measures show anticipated post-surgical deficits in ankle mobility, WB tolerance, strength, endurance, and balance and stability after an ORIF 7/15/25 after a trimalleolar fracture 7/11/25. The patient is currently NWB using a kneeling walker for most daily ambulation. The patient works for [employer] but is currently on short-term disability.. Functional deficits include but are not limited to WB through left LE for standing, walking, navigating stairs, and squatting.. Signs and symptoms are consistent with diagnosis of S/P ORIF (open reduction internal fixation) fracture (Z98.890,Z87.81). Pt and PT discussed evaluation findings, pathology, POC and HEP.  Pt voiced understanding and performs HEP correctly without reported pain. Skilled Physical Therapy is medically necessary to address the above impairments and reach functional goals.    OBJECTIVE:      Musculoskeletal  Observation: The patient is with diffuse bruising and swelling throughout his left ankle and lower leg.  Palpation: Patient is with noted hypomobility at his ankle due to edema and immobilization.   Gait: Patient ambulates into the clinic with kneeling scooter, NWB on left LE.      Edema: Patient is with diffuse edema throughout his left ankle and lower leg.      ROM and Strength: (* denotes performed with pain)  Ankle/Foot   ROM MMT (-/5)    R L R L     PF N/A 45 Not tested Not tested     DF (L4) N/A -10 Not tested Not tested      Today's Treatment and Response:   Pt education was provided on exam findings, treatment diagnosis, treatment plan, expectations, and prognosis.    Today's Treatment        7/23/2025   LE Treatment   Therapeutic Exercise PROM - ankle DF/PF, great toe ext   Ankle pumps: x 20 (L)  Toe flex/ext: x 20 (L)   SLR: x 20 (L)   S/L hip ABD: x 20 (L)   Provided patient with a written copy of exercise instruction which was also documented in patient's electronic medical chart. Educated the patient on the need for consistency with HEP to achieve the mutually established goals.  Reviewed individual insurance plan benefits/coverage and provided patient an appointment list, contact information for the clinic and treating providers and company attendance policy.      Manual Therapy Edema massage to left foot and ankle x 10min   Therapeutic Activity Education on anatomy and pathophysiology of current condition, rationale for physical therapy, anticipated treatment interventions, prognosis, timeline for recovery, and expected functional outcomes based on evaluative findings.   Reviewed WB restrictions and progression as well as timeline for healing and recovery.   Verbally reviewed sequencing for crutch use with stairs.    Therapeutic Exercise Minutes 15   Manual Therapy Minutes 10   Therapeutic Activity Minutes 5   Evaluation Minutes 15   Total Time Of Timed Procedures 30   Total Time Of Service-Based Procedures 15   Total Treatment Time 45   HEP Access Code: [email or web address]  URL: [email or web address]  Date: 07/23/2025  Prepared by: Gracia Etienne    Exercises  - Seated Ankle Pumps  - 2 x daily - 7 x weekly - 2 sets - 10 reps  - Seated Toe Curl  - 2 x daily - 7 x weekly - 2 sets - 10 reps  - Straight Leg Raise  - 2 x daily - 7 x weekly - 2 sets - 10 reps  - Sidelying Hip Abduction  - 2 x daily - 7 x weekly - 2 sets - 10 reps        Patient was instructed in and issued a HEP for: Access Code: [email or web address]  URL: [email or web address]  Date: 07/23/2025  Prepared by: Gracia Etienne    Exercises  - Seated Ankle Pumps  - 2 x daily - 7 x weekly - 2 sets - 10 reps  - Seated Toe  Curl  - 2 x daily - 7 x weekly - 2 sets - 10 reps  - Straight Leg Raise  - 2 x daily - 7 x weekly - 2 sets - 10 reps  - Sidelying Hip Abduction  - 2 x daily - 7 x weekly - 2 sets - 10 reps    Charges:  PT EVAL: Low Complexity, MT x 1, TE x 1  In agreement with evaluation findings and clinical rationale, this evaluation involved LOW COMPLEXITY decision making due to no personal factors/comorbidities, 1-2 body structures involved/activity limitations, and stable symptoms as documented in the evaluation.                                                                                                                PLAN OF CARE:      Goals: (to be met in 20 visits)   Short-Term Goals:  Patient will demonstrates 10deg DF mobility on his/her ankle to facilitate mobility requirement for normalized gait pattern. Timeframe: 6 visits.  Patient will begin TTWB per MD instruction. Timeframe: 6 visits.   Patient will improve ankle mobility, strength, and endurance to facilitate ability to stand for 2 hour(s) daily for community integration. Timeframe: 10-12 visits.  Patient will demonstrate normalized gait pattern. Timeframe: 10-12 visits.  Long-Term Goals:  Patient will improve ankle mobility, strength, and endurance to facilitate walking distances of 1 mile(s) daily for household and community ambulation. Timeframe: 16 visits.  Patient will improve ankle mobility and strength for reciprocal stair navigation pattern with no asymmetries for ascending and descending 5 flights of stairs daily for household and community ambulation. Timeframe: 18-20 visits.  Patient will improve LEFS score by at least 9 points to indicate a true change in improved function for ADL's and restoring PLF. Timeframe: 18-20 visits.       Frequency / Duration: Patient will be seen 2x/week or a total of 20  visits over a 90 day period. Treatment will include: Gait training; Manual Therapy; Neuromuscular Re-education; Self-Care Home Management; Therapeutic  Activities; Therapeutic Exercise; Home Exercise Program instruction    Education or treatment limitation: None   Rehab Potential: excellent     LEFS Score  LEFS Score: (Patient-Rptd) 8.75 % (7/17/2025  9:58 AM)      Patient/Family/Caregiver was advised of these findings, precautions, and treatment options and has agreed to actively participate in planning and for this course of care.    Thank you for your referral. Please co-sign or sign and return this letter via fax as soon as possible to [phone]. If you have any questions, please contact me at Dept: [phone]    Sincerely,  Electronically signed by therapist: Gracia Etienne PT  Physician's certification required: Yes  I certify the need for these services furnished under this plan of treatment and while under my care.    X___________________________________________________ Date____________________    Certification From: 7/23/2025  To: 10/21/2025

## 2025-07-31 ENCOUNTER — OFFICE VISIT (OUTPATIENT)
Dept: PHYSICAL THERAPY | Age: 30
End: 2025-07-31
Attending: ORTHOPAEDIC SURGERY
Payer: MEDICAID

## 2025-07-31 PROCEDURE — 97110 THERAPEUTIC EXERCISES: CPT

## 2025-07-31 PROCEDURE — 97140 MANUAL THERAPY 1/> REGIONS: CPT

## 2025-08-18 ENCOUNTER — APPOINTMENT (OUTPATIENT)
Dept: PHYSICAL THERAPY | Age: 30
End: 2025-08-18
Attending: ORTHOPAEDIC SURGERY

## 2025-08-18 ENCOUNTER — OFFICE VISIT (OUTPATIENT)
Dept: PHYSICAL THERAPY | Age: 30
End: 2025-08-18
Attending: ORTHOPAEDIC SURGERY

## 2025-08-18 PROCEDURE — 97140 MANUAL THERAPY 1/> REGIONS: CPT

## 2025-08-18 PROCEDURE — 97110 THERAPEUTIC EXERCISES: CPT

## 2025-08-19 ENCOUNTER — APPOINTMENT (OUTPATIENT)
Dept: PHYSICAL THERAPY | Age: 30
End: 2025-08-19
Attending: ORTHOPAEDIC SURGERY

## 2025-08-19 ENCOUNTER — OFFICE VISIT (OUTPATIENT)
Dept: ORTHOPEDICS CLINIC | Facility: CLINIC | Age: 30
End: 2025-08-19

## 2025-08-19 DIAGNOSIS — Z87.81 S/P ORIF (OPEN REDUCTION INTERNAL FIXATION) FRACTURE: Primary | ICD-10-CM

## 2025-08-19 DIAGNOSIS — Z98.890 S/P ORIF (OPEN REDUCTION INTERNAL FIXATION) FRACTURE: Primary | ICD-10-CM

## 2025-08-21 ENCOUNTER — OFFICE VISIT (OUTPATIENT)
Dept: PHYSICAL THERAPY | Age: 30
End: 2025-08-21
Attending: ORTHOPAEDIC SURGERY

## 2025-08-21 PROCEDURE — 97140 MANUAL THERAPY 1/> REGIONS: CPT

## 2025-08-21 PROCEDURE — 97112 NEUROMUSCULAR REEDUCATION: CPT

## 2025-08-21 PROCEDURE — 97110 THERAPEUTIC EXERCISES: CPT

## 2025-08-26 ENCOUNTER — APPOINTMENT (OUTPATIENT)
Dept: PHYSICAL THERAPY | Age: 30
End: 2025-08-26
Attending: ORTHOPAEDIC SURGERY

## 2025-08-28 ENCOUNTER — APPOINTMENT (OUTPATIENT)
Dept: PHYSICAL THERAPY | Age: 30
End: 2025-08-28
Attending: ORTHOPAEDIC SURGERY

## (undated) DEVICE — BNDG,ELSTC,MATRIX,STRL,6"X5YD,LF,HOOK&LP: Brand: MEDLINE

## (undated) DEVICE — SUT MCRYL 3-0 27IN ABSRB UD 19MM PS-2 3/8

## (undated) DEVICE — GLOVE SUR 7.5 DERMASSURE PCP DK GRN PWD F

## (undated) DEVICE — DRAPE,U/SHT,SPLIT,FILM,60X84,STERILE: Brand: MEDLINE

## (undated) DEVICE — ANTISEPTIC 4OZ 70% ISO ALC

## (undated) DEVICE — SUT ETHLN 3-0 18IN PS-1 NABSRB BLK 24MM 3/8 C

## (undated) DEVICE — SUT COAT VCRL+ 0 27IN UR-6 ABSRB VLT ANTIBACT

## (undated) DEVICE — SUT MCRYL 0 27IN CT-1 ABSRB VLT 36MM 1/2 CIR

## (undated) DEVICE — Device

## (undated) DEVICE — C-ARM: Brand: UNBRANDED

## (undated) DEVICE — DRILL BIT 1.7MM

## (undated) DEVICE — COTTON ROLL,STERILE,1LB,1 FT. X 8.5 FT.: Brand: MEDLINE

## (undated) DEVICE — SPLINT ONESTEP 5X30IN FBRGLS MOLD

## (undated) DEVICE — SOLUTION IRRIG 1000ML 0.9% NACL USP BTL

## (undated) DEVICE — ELECTRODE CAUTERY TIP 2.5IN E-Z CLEAN MICRO NEEDLE DISP

## (undated) DEVICE — SLEEVE COMPR MD KNEE LEN SGL USE KENDALL SCD

## (undated) DEVICE — DISPOSABLE TOURNIQUET CUFF SINGLE BLADDER, DUAL PORT AND QUICK CONNECT CONNECTOR: Brand: COLOR CUFF

## (undated) DEVICE — SUT MCRYL 2-0 27IN SH ABSRB UD 26MM 1/2 CIR

## (undated) DEVICE — BANDAGE,COHESIVE,TAN,4X5YD,LF,STRL: Brand: MEDLINE

## (undated) DEVICE — SPLINT ONESTEP 4X30IN FBRGLS MOLD

## (undated) DEVICE — GLOVE SUR 7.5 SENSICARE PI PIP CRM PWD F

## (undated) DEVICE — XEROFORM OCCLUSIVE GAUZE STRIP OVERWRAP, 3% BISMUTH TRIBROMOPHENATE IN PETROLATUM BLEND: Brand: XEROFORM

## (undated) DEVICE — C-ARMOR C-ARM EQUIPMENT COVERS CLEAR STERILE UNIVERSAL FIT 12 PER CASE: Brand: C-ARMOR

## (undated) DEVICE — SYRINGE BULB 50/CS 48/PLT: Brand: MEDEGEN MEDICAL PRODUCTS, LLC

## (undated) DEVICE — LOWER EXTREMITY CDS-LF: Brand: MEDLINE INDUSTRIES, INC.

## (undated) NOTE — Clinical Note
[employer] like wishkicker has asthma. I started him on symbicort and singulair.    He is going to f/u with you soon  Varghese Ortiz

## (undated) NOTE — LETTER
ASTHMA ACTION PLAN for Simba Garza     : 1995     Date: 24  Doctor:  Asaf An MD  Phone for doctor or clinic: [facility], [address] [place] [id number] [phone]      ACT Score: 25    ACT Goal: 20 or greater    Call your provider if you require your rescue/quick reliever medication more than 2-3 times in a 24 hour period.    If you require your rescue inhaler/medication more than 2-3 times weekly, your asthma may not be under proper control and you should seek medical attention.    *Quick Relievers are Xopenex and Albuterol*    You can use the colors of a traffic light to help learn about your asthma medicines.  {Therapy/Year Round/Winter Mgmt/Seasonal:5667}       1. Green - Go! % of Personal Best Peak Flow   Use controller medicine.   Breathing is good  No cough or wheeze  Can work and play Medicine How much to take When to take it    Medications       Sympathomimetics Instructions     Budesonide-Formoterol Fumarate (SYMBICORT) 160-4.5 MCG/ACT Inhalation Aerosol Inhale 2 puffs into the lungs 2 (two) times daily. **Appointment needed for further refills.     albuterol 108 (90 Base) MCG/ACT Inhalation Aero Soln Inhale 2 puffs into the lungs every 4 (four) hours as needed for Wheezing.                    2. Yellow - Caution. 50-79% Personal Best Peak Flow  Use reliever medicine to keep an asthma attack from getting bad.   Cough  Quick Relievers  Wheezing  Tight Chest  Wake up at night Medicine How much to take When to take it    If symptoms are not improving in 24-48 hrs, call office for further instructions  Medications       Sympathomimetics Instructions     Budesonide-Formoterol Fumarate (SYMBICORT) 160-4.5 MCG/ACT Inhalation Aerosol Inhale 2 puffs into the lungs 2 (two) times daily. **Appointment needed for further refills.     albuterol 108 (90 Base) MCG/ACT Inhalation Aero Soln Inhale 2 puffs into the lungs every 4  (four) hours as needed for Wheezing.                    3. Red - Stop! Danger! <50% Personal Best Peak Flow  Continue Controller Medications But ADD:   Medicine not helping  Breathing is hard and fast  Nose opens wide  Can't walk  Ribs show  Can't talk well Medicine How much to take When to take it    If your symptoms do not improve in ONE hour -  go to the emergency room or call 911 immediately! If symptoms improve, call office for appointment immediately.    {Choose Albuterol/Xopenex INHALER or NEBULIZER every 20 min:6750}       Don't forget:  Rinse mouth after using inhaler  Use spacer for inhaler  Remember to get your Flu vaccine every fall!    [x] Asthma Action Plan reviewed with the caregiver and patient, and a copy of the plan was given to the patient/caregiver.   [] Asthma Action Plan reviewed with the caregiver and patient on the phone, and copy mailed to patient/caregiver or sent via [employer].     Signatures:   Provider  Asaf An MD Patient  Simba Parekhover Caretaker       ASTHMA ACTION PLAN for Simba Garza     : 1995     Date: 2024  Provider:  Asaf An MD  Phone for doctor or clinic: [facility], [address] [place] [id number] [phone]    ACT Score: 25      You can use the colors of a traffic light to help learn about your asthma medicines.      1. Green - Go! % of Personal Best Peak Flow Use controller medicine.   Breathing is good  No cough or wheeze  Can work and play Medicine How much to take When to take it          2. Yellow - Caution. 50-79% Personal Best Peak  Flow.  Use reliever medicine to keep an asthma attack from getting bad.   Cough  Wheezing  Tight Chest  Wake up at night Medicine How much to take When to take it           Additional instructions         3. Red - Stop! Danger!  <50% Personal Best Peak  Flow. Take these medications until  Get help from a doctor   Medicine not helping  Breathing  is hard and fast  Nose opens wide  Can't walk  Ribs show  Can't talk well Medicine How much to take When to take it         Additional Instructions If your symptoms do not improve and you cannot contact your doctor, [facility] or call 911 immediately!     [x] Asthma Action Plan reviewed with patient (and caregiver if necessary) and a copy of the plan was given to the patient/caregiver.   [] Asthma Action Plan reviewed with patient (and caregiver if necessary) on the phone and mailed copy to patient or submitted via [employer].     Signatures:  Provider  Asaf An MD   Patient Caretaker

## (undated) NOTE — LETTER
ASTHMA ACTION PLAN for Simba Garza     : 1995     Date: 2024  Provider:  Asaf An MD  Phone [facility] doctor [facility] clinic[facility], [address] [place] [id number] [phone]    ACT Score: 25      You can use the colors of a traffic light to help learn about your asthma medicines.      1. Green - Go! % of Personal Best Peak Flow Use controller medicine.   Breathing is good  No cough or wheeze  Can work and play Medicine How much to take When to take it    SYMBICORT Inhale 2 puffs into the lungs 2 (two) times daily       2. Yellow - Caution. 50-79% Personal Best Peak  Flow.  Use reliever medicine to keep an asthma attack from getting bad.   Cough  Wheezing  Tight Chest  Wake up at night Medicine How much to take When to take it    albuterol 108 (90 Base) MCG/ACT Inhalation Aero Soln Inhale 2 puffs into the lungs every 4 (four) hours as needed     SYMBICORT Inhale 2 puffs into the lungs 2 (two) times daily        Additional instructions         3. Red - Stop! Danger!  <50% Personal Best Peak  Flow. Take these medications until  Get help from a doctor   Medicine not helping  Breathing is hard and fast  Nose opens wide  Can't walk  Ribs show  Can't talk well Medicine How much to take When to take it    GO TO EMERGENCY ROOM      Additional Instructions If your symptoms do not improve and you cannot contact your doctor, [facility] or call [facility] immediately!     [x] Asthma Action Plan reviewed with patient (and caregiver if necessary) and a copy of the plan was given to the patient/caregiver.   [] Asthma Action Plan reviewed with patient (and caregiver if necessary) on the phone and mailed copy to patient or submitted via [employer].     Signatures:  Provider  Asaf An MD   Patient Caretaker

## (undated) NOTE — LETTER
AUTHORIZATION FOR SURGICAL OPERATION OR OTHER PROCEDURE    1. I hereby authorize Dr. Angus Mcnair, and [facility] staff assigned to my case to perform the following operation and/or procedure at the [facility] site:    _______________________________________________________________________________________________    Nail Avulsion Left Hallux  _______________________________________________________________________________________________    2.  My physician has explained the nature and purpose of the operation or other procedure, possible alternative methods of treatment, the risks involved, and the possibility of complication to me.  I acknowledge that no guarantee has been made as to the result that may be obtained.  3.  I recognize that, during the course of this operation, or other procedure, unforseen conditions may necessitate additional or different procedure than those listed above.  I, therefore, further authorize and request that the above named physician, his/her physician assistants or designees perform such procedures as are, in his/her professional opinion, necessary and desirable.  4.  Any tissue or organs removed in the operation or other procedure may be disposed of by and at the discretion of the [facility] and [facility].  5.  I understand that in the event of a medical emergency, I will be transported by local paramedics to [facility] or other hospital emergency department.  6.  I certify that I have read and fully understand the above consent to operation and/or other procedure.    7.  I acknowledge that my physician has explained sedation/analgesia administration to me including the risks and benefits.  I consent to the administration of sedation/analgesia as may be necessary or desirable in the judgement of my physician.    Witness signature: ___________________________________________________ Date:  ______/______/_____                     Time:  ________ A.M.  P.M.       Patient Name:  ______________________________________________________  (please print)      Patient signature:  ___________________________________________________             Relationship to Patient:           []  Parent    Responsible person                          []  Spouse  In case of minor or                    [] Other  _____________   Incompetent name:  __________________________________________________                               (please print)      _____________      Responsible person  In case of minor or  Incompetent signature:  _______________________________________________    Statement of Physician  My signature below affirms that prior to the time of the procedure, I have explained to the patient and/or his/her guardian, the risks and benefits involved in the proposed treatment and any reasonable alternative to the proposed treatment.  I have also explained the risks and benefits involved in the refusal of the proposed treatment and have answered the patient's questions.                        Date:  ______/______/_______  Provider                      Signature:  __________________________________________________________       Time:  ___________ A.M    P.M.

## (undated) NOTE — LETTER
04/09/21        Yoly Jimenez Dolores [address] [place] [id number]      Dear Yolie Cuevas,    [facility] records indicate that you have outstanding lab work and or testing that was ordered for you and has not yet been completed:  Orders Placed This Encounter